# Patient Record
Sex: FEMALE | Race: BLACK OR AFRICAN AMERICAN | NOT HISPANIC OR LATINO | Employment: FULL TIME | URBAN - METROPOLITAN AREA
[De-identification: names, ages, dates, MRNs, and addresses within clinical notes are randomized per-mention and may not be internally consistent; named-entity substitution may affect disease eponyms.]

---

## 2017-01-24 ENCOUNTER — PROCEDURE VISIT (OUTPATIENT)
Dept: OBSTETRICS AND GYNECOLOGY | Age: 32
End: 2017-01-24

## 2017-01-24 VITALS
BODY MASS INDEX: 27.64 KG/M2 | DIASTOLIC BLOOD PRESSURE: 74 MMHG | HEIGHT: 63 IN | WEIGHT: 156 LBS | SYSTOLIC BLOOD PRESSURE: 100 MMHG

## 2017-01-24 DIAGNOSIS — N87.1 DYSPLASIA OF CERVIX, HIGH GRADE CIN 2: ICD-10-CM

## 2017-01-24 DIAGNOSIS — Z01.812 PRE-PROCEDURE LAB EXAM: Primary | ICD-10-CM

## 2017-01-24 LAB
B-HCG UR QL: NEGATIVE
INTERNAL NEGATIVE CONTROL: NEGATIVE
INTERNAL POSITIVE CONTROL: POSITIVE
Lab: NORMAL

## 2017-01-24 PROCEDURE — 57461 CONZ OF CERVIX W/SCOPE LEEP: CPT | Performed by: OBSTETRICS & GYNECOLOGY

## 2017-01-24 PROCEDURE — 81025 URINE PREGNANCY TEST: CPT | Performed by: OBSTETRICS & GYNECOLOGY

## 2017-01-24 NOTE — PROGRESS NOTES
Colposcopy Procedure Note with LEEP    Indications: Pap smear 6 months ago showed: low-grade squamous intraepithelial neoplasia (LGSIL - encompassing HPV,mild dysplasia,ENOCH I). Prior pap testing had been abnormal as well. Colposcopic biopsy at 6:00 showed HSIL, ENOCH 2 and LEEP recommended. Patient counseled as to risks of procedure to include bleeding, transfusion, infection, damage to cervix, stenosis or incompetent cervix with  labor or delivery, recurrent or persistent dysplasia, need for future treatment and follow-up and pt desires.    Procedure Details   The risks and benefits of the procedure and verbal and written informed consent obtained.    Speculum placed in vagina and excellent visualization of cervix achieved, cervix swabbed x 3 with acetic acid solution.    Findings:  Cervix: acetowhite lesion(s) noted at 6 o'clock; cervix swabbed with Lugol's solution and area of non-staining at 6:00 noted as well. Cervix injected with 2% lidocaine with 1/200 epi circumferentially using 2.5 cc resulting in appropriate blanching. LEEP loop 1.5 X 0.7 cm used to resect non-staining area and T zone without difficulty. ECC performed and base of LEEP cauterized with roller ball. Base hemostatic and  monsels applied.   .    Specimens: LEEP with stitch at 12:00 and  ECC    Complications: none.    Plan:  Return to discuss Pathology results in 2 weeks.  LEEP f/u instructions reviewed and patient understands to call with fever, pain or bleeding. Advised nothing per vagina for 6 weeks, no strenuous activity.    .Physical Exam   Genitourinary:   Genitourinary Comments: Cervix without gross lesions prior to colpo/LEEP, AWE and nonstaining region at 6:00 excised with LEEP loop, LEEP base hemostatic, roller ball used to cauterize base and Monsels applied to hemostatic base

## 2017-01-24 NOTE — MR AVS SNAPSHOT
Gustabo Gil   2017 9:00 AM   Procedure visit    Dept Phone:  [phone]   Encounter #:  [id number]    Provider:  Cassandra Fields MD   Department:  [facility]                Your Full Care Plan              Your Updated Medication List          This list is accurate as of: 17 10:46 AM.  Always use your most recent med list.                Biotin 10 MG capsule       fluconazole 150 MG tablet   Commonly known as:  DIFLUCAN   Take 1 tablet once, may repeat in 3 days if needed               We Performed the Following     POC Pregnancy, Urine     Reference Histopathology       You Were Diagnosed With        Codes Comments    Pre-procedure lab exam    -  Primary ICD-10-CM: Z01.812  ICD-9-CM: V72.63     Dysplasia of cervix, high grade ENOCH 2     ICD-10-CM: N87.1  ICD-9-CM: 622.12       Instructions     None    Patient Instructions History      Upcoming Appointments     Visit Type Date Time Department    IN OFFICE PROCEDURE 2017  9:00 AM [facility]      Love[employer] Signup     [facility] Love[employer] allows you to send messages to your doctor, view your test results, renew your prescriptions, schedule appointments, and more. To sign up, go to [employer] and click on the Sign Up Now link in the New User? box. Enter your Love[employer] Activation Code exactly as it appears below along with the last four digits of your Social Security Number and your Date of Birth () to complete the sign-up process. If you do not sign up before the expiration date, you must request a new code.    Love[employer] Activation Code: [email or web address]  Expires: 2017 10:46 AM    If you have questions, you can email [email or web address] or call [phone] to talk to our [employer] staff. Remember, Love[employer] is NOT to be used for urgent needs. For medical emergencies, dial 911.               Other Info from Your Visit           Allergies     No Known Allergies    "  Reason for Visit     Colposcopy leep      Vital Signs     Blood Pressure Height Weight Last Menstrual Period Body Mass Index Smoking Status    100/74 63\" (160 cm) 156 lb (70.8 kg) 01/09/2017 27.63 kg/m2 Never Smoker      Problems and Diagnoses Noted     Pre-operative laboratory examination    -  Primary    Dysplasia of cervix, high grade ENOCH 2          Results     POC Pregnancy, Urine      Component Value Standard Range & Units    HCG, Urine, QL Negative Negative    Lot Number [id number]     Internal Positive Control Positive     Internal Negative Control Negative                     "

## 2017-01-26 LAB
DX ICD CODE: NORMAL
DX ICD CODE: NORMAL
PATH REPORT.FINAL DX SPEC: NORMAL
PATH REPORT.GROSS SPEC: NORMAL
PATH REPORT.RELEVANT HX SPEC: NORMAL
PATH REPORT.SITE OF ORIGIN SPEC: NORMAL
PATHOLOGIST NAME: NORMAL
PAYMENT PROCEDURE: NORMAL

## 2017-01-27 ENCOUNTER — TELEPHONE (OUTPATIENT)
Dept: OBSTETRICS AND GYNECOLOGY | Age: 32
End: 2017-01-27

## 2017-01-27 NOTE — TELEPHONE ENCOUNTER
----- Message from Cassandra Fields MD sent at 1/26/2017  5:43 PM EST -----  Call pt, LEEP with HSIL as expected, all margins are negative ( good) so keep scheduled postop or call with issues

## 2017-01-30 ENCOUNTER — TELEPHONE (OUTPATIENT)
Dept: OBSTETRICS AND GYNECOLOGY | Age: 32
End: 2017-01-30

## 2017-01-30 DIAGNOSIS — N76.0 BV (BACTERIAL VAGINOSIS): Primary | ICD-10-CM

## 2017-01-30 DIAGNOSIS — B96.89 BV (BACTERIAL VAGINOSIS): Primary | ICD-10-CM

## 2017-01-30 RX ORDER — METRONIDAZOLE 500 MG/1
500 TABLET ORAL 2 TIMES DAILY
Qty: 14 TABLET | Refills: 0 | Status: SHIPPED | OUTPATIENT
Start: 2017-01-30 | End: 2017-02-06

## 2017-01-31 NOTE — TELEPHONE ENCOUNTER
Pt with BV symptoms after LEEP no active bleeding, fever or pelvic pain. She has used flagyl in past, sent in, pt aware to avoid alcohol while on treatment

## 2017-02-03 ENCOUNTER — TELEPHONE (OUTPATIENT)
Dept: OBSTETRICS AND GYNECOLOGY | Age: 32
End: 2017-02-03

## 2017-02-06 ENCOUNTER — OFFICE VISIT (OUTPATIENT)
Dept: OBSTETRICS AND GYNECOLOGY | Age: 32
End: 2017-02-06

## 2017-02-06 VITALS
SYSTOLIC BLOOD PRESSURE: 110 MMHG | DIASTOLIC BLOOD PRESSURE: 70 MMHG | HEIGHT: 63 IN | BODY MASS INDEX: 28.35 KG/M2 | WEIGHT: 160 LBS

## 2017-02-06 DIAGNOSIS — D06.9 SEVERE CERVICAL DYSPLASIA: Primary | ICD-10-CM

## 2017-02-06 PROCEDURE — 99024 POSTOP FOLLOW-UP VISIT: CPT | Performed by: OBSTETRICS & GYNECOLOGY

## 2017-02-06 NOTE — PROGRESS NOTES
"Chief complaint:postop     HPI  Gustabo Gil is a 32 y.o. female. Pt without significant issues. BV better after treatment. Still with light, blood-tinged discharge. No heavy bleeding or pelvic pain.        The following portions of the patient's history were reviewed and updated as appropriate: allergies, current medications, past family history, past medical history, past social history, past surgical history and problem list.    Review of Systems  Pertinent items are noted in HPI.    Visit Vitals   • /70   • Ht 63\" (160 cm)   • Wt 160 lb (72.6 kg)   • LMP 01/09/2017   • BMI 28.34 kg/m2       Objective   Physical Exam   Constitutional: She appears well-developed and well-nourished.   Pulmonary/Chest: Effort normal.   Abdominal: Soft. She exhibits no distension. There is no tenderness.   Genitourinary: Vagina normal and uterus normal.   Genitourinary Comments: LEEP base healing well, cervix and uterus are nontender       Assessment/Plan   Gustabo was seen today for follow-up.    Diagnoses and all orders for this visit:    Severe cervical dysplasia      Normal postop check, f/u 4 weeks then pap Q 6 months for 2 years         "

## 2017-03-06 ENCOUNTER — OFFICE VISIT (OUTPATIENT)
Dept: OBSTETRICS AND GYNECOLOGY | Age: 32
End: 2017-03-06

## 2017-03-06 VITALS
DIASTOLIC BLOOD PRESSURE: 72 MMHG | WEIGHT: 157 LBS | SYSTOLIC BLOOD PRESSURE: 100 MMHG | BODY MASS INDEX: 27.82 KG/M2 | HEIGHT: 63 IN

## 2017-03-06 DIAGNOSIS — Z80.41 FAMILY HISTORY OF OVARIAN CANCER: ICD-10-CM

## 2017-03-06 DIAGNOSIS — Z01.419 ENCOUNTER FOR GYNECOLOGICAL EXAMINATION: ICD-10-CM

## 2017-03-06 DIAGNOSIS — D06.9 SEVERE CERVICAL DYSPLASIA: Primary | ICD-10-CM

## 2017-03-06 DIAGNOSIS — Z09 POSTOP CHECK: ICD-10-CM

## 2017-03-06 PROCEDURE — 99395 PREV VISIT EST AGE 18-39: CPT | Performed by: OBSTETRICS & GYNECOLOGY

## 2017-03-06 NOTE — PROGRESS NOTES
"Subjective     Chief Complaint   Patient presents with   • Follow-up     f/u leep       History of Present Illness    Gustabo Gil is a 32 y.o.  who presents for annual exam and LEEP postop  Pt recently found out that pat aunt  of ovarian cancer, she does not know of any other cancers on that side and she is aware her father had 8 sisters    Obstetric History:  OB History      Para Term  AB TAB SAB Ectopic Multiple Living    1    1  1            Menstrual History:     Patient's last menstrual period was 2017.         Current contraception: abstinence  History of abnormal Pap smear: yes - recent LEEP with ENOCH 2  Received Gardasil immunization: no  Perform regular self breast exam: no  Family history of uterine or ovarian cancer: yes - pat aunt  Family History of colon cancer: no  Family history of breast cancer: no    Mammogram: not indicated.  Colonoscopy: not indicated.  DEXA: not indicated.    Exercise: moderately active      The following portions of the patient's history were reviewed and updated as appropriate: allergies, current medications, past family history, past medical history, past social history, past surgical history and problem list.    Review of Systems    A comprehensive review of systems was negative except for: Genitourinary: positive for pt had an episode of right pelvic pain, not present currently but with prior cycle     Objective   Physical Exam    Visit Vitals   • /72   • Ht 63\" (160 cm)   • Wt 157 lb (71.2 kg)   • LMP 2017   • BMI 27.81 kg/m2       General:   alert, appears stated age, cooperative and no distress   Neck: no asymmetry, masses, or scars and thyroid normal to palpation   Heart: regular rate and rhythm   Lungs: clear to auscultation bilaterally   Abdomen: soft, non-tender, without masses or organomegaly   Breast: inspection negative, no nipple discharge or bleeding, no masses or nodularity palpable   Vulva: normal, Bartholin's, " Urethra, [place]'s normal   Vagina: normal mucosa, normal discharge   Cervix: no lesions and LEEP base healed   Uterus: normal size, mobile, non-tender, normal shape and consistency   Adnexa: normal adnexa and no mass, fullness, tenderness   Rectal: not indicated     Assessment/Plan   Gustabo was seen today for follow-up.    Diagnoses and all orders for this visit:    Severe cervical dysplasia    Postop check    Encounter for gynecological examination    Family history of ovarian cancer        All questions answered.  Breast self exam technique reviewed and patient encouraged to perform self-exam monthly.  Discussed healthy lifestyle modifications.  Recommended 30 minutes of aerobic exercise five times per week.    Pt with recent news that paternal aunt had ovarian cancer, she denies any other known cancers on that side, reviewed option for genetic testing and gave handout, pt will consider; did note that her father had very large family and she does not know of any other cancers, she will attempt to get more information from that side    Pt did have right pelvic pain in last few months, will check u/s--offered u/s today, pt had to leave and scheduled in future    F/u pap in 5 months for post-LEEP surveillance, path with negative margins

## 2017-06-14 ENCOUNTER — TELEPHONE (OUTPATIENT)
Dept: OBSTETRICS AND GYNECOLOGY | Age: 32
End: 2017-06-14

## 2017-06-14 NOTE — TELEPHONE ENCOUNTER
Called pt back, she reports she has some pain on right side about a week ago and then also throughout abdomen, she denies fever, bleeding or N/V; she denies pregnancy and reports menses about q 35 days and there has been no change in menses. Pt had u/s scheduled for pelvic discomfort but no showed in March. Patient reports intermittent but worse last few days, recommend she go to ER if significant pain noted or with fever. If symptoms improve, recommend pt call in am and re-schedule visit and u/s since this was recommended previously

## 2017-06-15 ENCOUNTER — PROCEDURE VISIT (OUTPATIENT)
Dept: OBSTETRICS AND GYNECOLOGY | Age: 32
End: 2017-06-15

## 2017-06-15 DIAGNOSIS — R10.2 PELVIC PAIN IN FEMALE: Primary | ICD-10-CM

## 2017-06-15 PROCEDURE — 76830 TRANSVAGINAL US NON-OB: CPT | Performed by: OBSTETRICS & GYNECOLOGY

## 2017-06-16 ENCOUNTER — TELEPHONE (OUTPATIENT)
Dept: OBSTETRICS AND GYNECOLOGY | Age: 32
End: 2017-06-16

## 2017-06-16 NOTE — TELEPHONE ENCOUNTER
Pt came in to office yesterday at 8:00 am and was worked in for u/s by staff since she had no showed for prior. Patient was not seen by practitioner. Reviewed u/s and simple follicular cyst was noted in right ovary measuring 1.7 X 1.1 with small fluid in right adnexa. Normal uterus and left ovary noted. Pt called today for me to review u/s. Spoke with pt and advised pt of u/s findings but noted I had recommended during prior conversation that she call and schedule visit with MD or practitioner and U/S. Pt did not call prior to u/s yesterday to schedule visit but instead came to desk requesting u/s only. Advised pt that this is not adequate evaluation for worsening pain and that she could have ectopic pregnancy, PID or other non-gyn issues causing pain. Advised pt that source of pain could be life-threatening and recommended she go to ER or urgent care asap.

## 2017-07-11 ENCOUNTER — TELEPHONE (OUTPATIENT)
Dept: OBSTETRICS AND GYNECOLOGY | Age: 32
End: 2017-07-11

## 2017-07-11 DIAGNOSIS — B96.89 BV (BACTERIAL VAGINOSIS): Primary | ICD-10-CM

## 2017-07-11 DIAGNOSIS — N76.0 BV (BACTERIAL VAGINOSIS): Primary | ICD-10-CM

## 2017-07-11 RX ORDER — METRONIDAZOLE 500 MG/1
500 TABLET ORAL 2 TIMES DAILY
Qty: 14 TABLET | Refills: 0 | Status: SHIPPED | OUTPATIENT
Start: 2017-07-11 | End: 2017-07-18

## 2017-07-11 NOTE — TELEPHONE ENCOUNTER
Called pt back, she feels she has BV and requested prescription for flagyl, she tolerates this well and knows not to use with alcohol intake. Reviewed chart and advised pt she is due for a f/u pap from her LEEP. She reports she will call when she gets back in town in 2 weeks and schedule appt.

## 2017-08-22 ENCOUNTER — PROCEDURE VISIT (OUTPATIENT)
Dept: OBSTETRICS AND GYNECOLOGY | Age: 32
End: 2017-08-22

## 2017-08-22 VITALS
DIASTOLIC BLOOD PRESSURE: 56 MMHG | HEIGHT: 63 IN | SYSTOLIC BLOOD PRESSURE: 100 MMHG | WEIGHT: 162 LBS | BODY MASS INDEX: 28.7 KG/M2

## 2017-08-22 DIAGNOSIS — D06.9 SEVERE CERVICAL DYSPLASIA: Primary | ICD-10-CM

## 2017-08-22 PROCEDURE — 99212 OFFICE O/P EST SF 10 MIN: CPT | Performed by: OBSTETRICS & GYNECOLOGY

## 2017-08-22 NOTE — PROGRESS NOTES
"Chief complaint:hx HSIL     HPI  Gustabo Gil is a 32 y.o. female. Here for f/u pap after LEEP with HSIL with negative margins. Patient reports that her menses are regular. She was evaluated for some pelvic pain and reports it has resolved except for occasional discomfort around ovulation. Pt was treated for UTI during that episode and acute pain resolved.  Patient denies irregular bleeding. She recently took course of flagyl recently for BV, she still has some discharge but improved and odor resolved        The following portions of the patient's history were reviewed and updated as appropriate: allergies, current medications, past family history, past medical history, past social history, past surgical history and problem list.    Review of Systems  Pertinent items are noted in HPI.    /56  Ht 63\" (160 cm)  Wt 162 lb (73.5 kg)  LMP 07/26/2017  BMI 28.7 kg/m2    Objective   Physical Exam   Constitutional: She appears well-developed and well-nourished.   Abdominal: Soft. She exhibits no distension. There is no tenderness.   Genitourinary: Vagina normal.   Genitourinary Comments: No CMT, cervix normal and without lesions, uterus mobile and nontender, no adnexal masses or tenderness       Assessment/Plan   Gustabo was seen today for follow-up.    Diagnoses and all orders for this visit:    Severe cervical dysplasia  -     IGP, Apt HPV,rfx 16 / 18,45      If pap negative, plan f/u in 6 months, will also be due for annual         "

## 2017-08-28 LAB
CYTOLOGIST CVX/VAG CYTO: ABNORMAL
CYTOLOGY CVX/VAG DOC THIN PREP: ABNORMAL
DX ICD CODE: ABNORMAL
DX ICD CODE: ABNORMAL
HIV 1 & 2 AB SER-IMP: ABNORMAL
HPV I/H RISK 4 DNA CVX QL PROBE+SIG AMP: NEGATIVE
OTHER STN SPEC: ABNORMAL
PATH REPORT.FINAL DX SPEC: ABNORMAL
PATHOLOGIST CVX/VAG CYTO: ABNORMAL
STAT OF ADQ CVX/VAG CYTO-IMP: ABNORMAL

## 2017-09-19 ENCOUNTER — TELEPHONE (OUTPATIENT)
Dept: OBSTETRICS AND GYNECOLOGY | Age: 32
End: 2017-09-19

## 2017-09-19 NOTE — TELEPHONE ENCOUNTER
Pt called to sched, isn't sure what she needs. I didn't see any notes, saw pt's pap but did not discuss with her as I wasn't sure of you had.

## 2017-09-22 ENCOUNTER — TELEPHONE (OUTPATIENT)
Dept: OBSTETRICS AND GYNECOLOGY | Age: 32
End: 2017-09-22

## 2017-09-22 NOTE — TELEPHONE ENCOUNTER
Pt called and reviewed pap. Noted HPV negative. Patient still needs colpo since s/p LEEP recently. Patient understands and transferred to  to book

## 2017-10-31 ENCOUNTER — PROCEDURE VISIT (OUTPATIENT)
Dept: OBSTETRICS AND GYNECOLOGY | Age: 32
End: 2017-10-31

## 2017-10-31 VITALS
HEIGHT: 63 IN | BODY MASS INDEX: 28.7 KG/M2 | SYSTOLIC BLOOD PRESSURE: 112 MMHG | DIASTOLIC BLOOD PRESSURE: 60 MMHG | WEIGHT: 162 LBS

## 2017-10-31 DIAGNOSIS — Z01.812 PRE-PROCEDURE LAB EXAM: Primary | ICD-10-CM

## 2017-10-31 DIAGNOSIS — Z87.410 HISTORY OF CERVICAL DYSPLASIA: ICD-10-CM

## 2017-10-31 DIAGNOSIS — R87.610 ASCUS OF CERVIX WITH NEGATIVE HIGH RISK HPV: ICD-10-CM

## 2017-10-31 PROBLEM — D06.9 SEVERE CERVICAL DYSPLASIA: Status: RESOLVED | Noted: 2017-02-06 | Resolved: 2017-10-31

## 2017-10-31 PROCEDURE — 57454 BX/CURETT OF CERVIX W/SCOPE: CPT | Performed by: OBSTETRICS & GYNECOLOGY

## 2017-10-31 PROCEDURE — 81025 URINE PREGNANCY TEST: CPT | Performed by: OBSTETRICS & GYNECOLOGY

## 2017-10-31 NOTE — PROGRESS NOTES
Procedure   Procedures       Physical Exam   Genitourinary:       Genitourinary Comments: Faint AWE and nonstaining with lugols at sites in red, biopsies obtained       Colposcopy Procedure Note    Indications: Pap smear 2 months ago showed: ASCUS with NEGATIVE high risk HPV. The prior pap showed low-grade squamous intraepithelial neoplasia (LGSIL - encompassing HPV,mild dysplasia,ENOCH I).  Prior cervical/vaginal disease: hsil. Prior cervical treatment:  .    Procedure Details   The risks and benefits of the procedure and LEEP informed consent obtained.    Speculum placed in vagina and excellent visualization of cervix achieved, cervix swabbed x 3 with acetic acid solution.    Findings:  Cervix: acetowhite lesion(s) noted at 11,3 o'clock; cervix swabbed with Lugol's solution, endocervical curettage performed, cervical biopsies taken at 11, 3 o'clock, specimen labelled and sent to pathology and hemostasis achieved with Monsel's solution.      Specimens: ECC and cervical biopsies at 11,3:00    Complications: none.    Plan:  Specimens labelled and sent to Pathology.  Will base further treatment on Pathology findings.  Post biopsy instructions given to patient.    If negative biopsies, plan pap in 6 months, if LSIL, plan colpo at f/u ( 6 month f/u colpo planned )    10/31/2017  Cassandra Fields MD

## 2017-11-06 ENCOUNTER — TELEPHONE (OUTPATIENT)
Dept: OBSTETRICS AND GYNECOLOGY | Age: 32
End: 2017-11-06

## 2017-11-06 NOTE — TELEPHONE ENCOUNTER
----- Message from Cassandra Fields MD sent at 11/3/2017 11:55 PM EDT -----  Call pt, biopsies with squamous atypia, pathologist feels could be reactive, no definite dysplasia so would observe with pap in 6 months

## 2018-02-26 ENCOUNTER — OFFICE VISIT (OUTPATIENT)
Dept: OBSTETRICS AND GYNECOLOGY | Age: 33
End: 2018-02-26

## 2018-02-26 VITALS
SYSTOLIC BLOOD PRESSURE: 130 MMHG | BODY MASS INDEX: 29.06 KG/M2 | DIASTOLIC BLOOD PRESSURE: 74 MMHG | HEIGHT: 63 IN | WEIGHT: 164 LBS

## 2018-02-26 DIAGNOSIS — Z87.410 HISTORY OF CERVICAL DYSPLASIA: Primary | ICD-10-CM

## 2018-02-26 DIAGNOSIS — Z01.419 VISIT FOR GYNECOLOGIC EXAMINATION: ICD-10-CM

## 2018-02-26 DIAGNOSIS — Z11.51 SCREENING FOR HPV (HUMAN PAPILLOMAVIRUS): ICD-10-CM

## 2018-02-26 PROCEDURE — 99395 PREV VISIT EST AGE 18-39: CPT | Performed by: OBSTETRICS & GYNECOLOGY

## 2018-02-26 NOTE — PROGRESS NOTES
"Subjective     Chief Complaint   Patient presents with   • Annual Exam     no problems       History of Present Illness    Gustabo iGl is a 33 y.o.  who presents for annual exam.  Pt reports menses are about every 35 days, flow is normal, she denies any gyn issues  Obstetric History:  OB History      Para Term  AB Living    1    1     SAB TAB Ectopic Multiple Live Births    1             Menstrual History:     Patient's last menstrual period was 2018.         Current contraception: condoms  History of abnormal Pap smear: yes - hx LEEP last year  Received Gardasil immunization: no  Perform regular self breast exam: no  Family history of uterine or ovarian cancer: yes - Pat aunt  Family History of colon cancer: no  Family history of breast cancer: no    Mammogram: not indicated.  Colonoscopy: not indicated.  DEXA: not indicated.    Exercise: moderately active  Calcium/Vitamin D: adequate intake    The following portions of the patient's history were reviewed and updated as appropriate: allergies, current medications, past family history, past medical history, past social history, past surgical history and problem list.    Review of Systems    Review of Systems   Constitutional: Negative for fatigue.   Respiratory: Negative for shortness of breath.    Gastrointestinal: Negative for abdominal pain.   Genitourinary: Negative for dysuria. negative for abnormal bleeding  Neurological: Negative for headaches.   Psychiatric/Behavioral: Negative for dysphoric mood.         Objective   Physical Exam    /74  Ht 160 cm (63\")  Wt 74.4 kg (164 lb)  LMP 2018  BMI 29.05 kg/m2    General:   alert, appears stated age, cooperative and no distress   Neck: no asymmetry, masses, or scars and thyroid normal to palpation   Heart: regular rate and rhythm, S1, S2 normal, no murmur, click, rub or gallop   Lungs: clear to auscultation bilaterally   Abdomen: soft, non-tender, without masses or " organomegaly   Breast: inspection negative, no nipple discharge or bleeding, no masses or nodularity palpable and no axillary adenopathy   Vulva: normal, Bartholin's, Urethra, [place]'s normal   Vagina: normal mucosa, normal discharge   Cervix: no lesions and Pap done   Uterus: normal size, mobile, non-tender, normal shape and consistency   Adnexa: normal adnexa and no mass, fullness, tenderness   Rectal: not indicated     Assessment/Plan   Gustabo was seen today for annual exam.    Diagnoses and all orders for this visit:    History of cervical dysplasia  -     IGP, Apt HPV,rfx 16 / 18,45 - ThinPrep Vial, Cervix    Visit for gynecologic examination  -     IGP, Apt HPV,rfx 16 / 18,45 - ThinPrep Vial, Cervix    Screening for HPV (human papillomavirus)  -     IGP, Apt HPV,rfx 16 / 18,45 - ThinPrep Vial, Cervix        All questions answered.  Breast self exam technique reviewed and patient encouraged to perform self-exam monthly.  Discussed healthy lifestyle modifications.  Recommended 30 minutes of aerobic exercise five times per week.    Pt lists family hx of ovarian cancer. Offered My Risk testing. Advised recommended due to hx. Pt notes she listed this but is not sure if it was ovarian cancer. Pt declines testing today but will consider, information given    Pap and hpv done due to hx HSIL, f/u pap was ascus hpv negative, colpo was negative for HSIL; reviewed with pt that we will call with pap, if negative, plan f/u 6 months due to hx. Pt advised to call if she does not receive call within 2 weeks with results

## 2018-03-01 LAB
CYTOLOGIST CVX/VAG CYTO: NORMAL
CYTOLOGY CVX/VAG DOC THIN PREP: NORMAL
DX ICD CODE: NORMAL
HIV 1 & 2 AB SER-IMP: NORMAL
HPV I/H RISK 4 DNA CVX QL PROBE+SIG AMP: NEGATIVE
OTHER STN SPEC: NORMAL
PATH REPORT.FINAL DX SPEC: NORMAL
STAT OF ADQ CVX/VAG CYTO-IMP: NORMAL

## 2018-03-05 ENCOUNTER — TELEPHONE (OUTPATIENT)
Dept: OBSTETRICS AND GYNECOLOGY | Age: 33
End: 2018-03-05

## 2018-03-16 ENCOUNTER — TELEPHONE (OUTPATIENT)
Dept: OBSTETRICS AND GYNECOLOGY | Age: 33
End: 2018-03-16

## 2018-03-16 RX ORDER — METRONIDAZOLE 500 MG/1
500 TABLET ORAL 2 TIMES DAILY
Qty: 14 TABLET | Refills: 0 | Status: SHIPPED | OUTPATIENT
Start: 2018-03-16 | End: 2018-03-23

## 2018-03-16 NOTE — TELEPHONE ENCOUNTER
Pt called and requested another course of flagyl for BV. She has taken in past and has been counseled previously regarding use of medication and side effects.

## 2018-03-16 NOTE — TELEPHONE ENCOUNTER
PT WANTING FLAGYL CALLED IN DUE TO BV SYMPTOMS, +ODOR AND D/C. STATES DR CRABTREE JUST CALLS HER SOMETHING IN.

## 2018-08-24 ENCOUNTER — OFFICE VISIT (OUTPATIENT)
Dept: OBSTETRICS AND GYNECOLOGY | Age: 33
End: 2018-08-24

## 2018-08-24 VITALS
SYSTOLIC BLOOD PRESSURE: 110 MMHG | HEIGHT: 63 IN | BODY MASS INDEX: 28.88 KG/M2 | DIASTOLIC BLOOD PRESSURE: 70 MMHG | WEIGHT: 163 LBS

## 2018-08-24 DIAGNOSIS — Z87.410 HISTORY OF CERVICAL DYSPLASIA: Primary | ICD-10-CM

## 2018-08-24 DIAGNOSIS — Z11.3 SCREEN FOR STD (SEXUALLY TRANSMITTED DISEASE): ICD-10-CM

## 2018-08-24 PROCEDURE — 99213 OFFICE O/P EST LOW 20 MIN: CPT | Performed by: OBSTETRICS & GYNECOLOGY

## 2018-08-24 NOTE — PROGRESS NOTES
"Chief complaint:hx HSIL    HPI  Gustabo Gil is a 33 y.o. female presents for f/u pap smear due to hx HSIL. Pt denies abnormal bleeding, pelvic pain or abnormal discharge. She would like a screen for STD's        The following portions of the patient's history were reviewed and updated as appropriate: allergies, current medications, past family history, past medical history, past social history, past surgical history and problem list.    Review of Systems  Constitutional: negative  Genitourinary:neg for abnormal discharge, irregular bleeding or pelvic pain  Integument/breast: negative    /70   Ht 160 cm (63\")   Wt 73.9 kg (163 lb)   LMP 07/23/2018   BMI 28.87 kg/m²         Physical Exam   Constitutional: She appears well-developed and well-nourished.   Pulmonary/Chest: Effort normal.   Genitourinary:   Genitourinary Comments: Normal external female genitalia, vagina without lesions, cervix without lesions; no CMT, uterine or adnexal tenderness   Psychiatric: She has a normal mood and affect. Her behavior is normal.           Gustabo was seen today for follow-up.    Diagnoses and all orders for this visit:    History of cervical dysplasia  -     IGP, Apt HPV,rfx 16 / 18,45    Screen for STD (sexually transmitted disease)  -     Chlamydia trachomatis, Neisseria gonorrhoeae, Trichomonas vaginalis, PCR - Swab, Vagina  -     Hepatitis B Surface Antigen  -     HIV-1 / O / 2 Ag / Antibody 4th Generation  -     RPR      Repeat pap done, if normal, pt will return in 6 months for annual  STD screen requested today, pt denies known exposure or need for contraception          "

## 2018-08-25 LAB
HBV SURFACE AG SERPL QL IA: NEGATIVE
HIV 1+2 AB+HIV1 P24 AG SERPL QL IA: NON REACTIVE
RPR SER QL: NORMAL

## 2018-08-27 ENCOUNTER — TELEPHONE (OUTPATIENT)
Dept: OBSTETRICS AND GYNECOLOGY | Age: 33
End: 2018-08-27

## 2018-08-27 NOTE — TELEPHONE ENCOUNTER
----- Message from Cassandra Fields MD sent at 8/27/2018  7:53 AM EDT -----  Please call MsBrian Jeffery, her bloodwork is negative for HIV, RPR and Hep B

## 2018-08-28 ENCOUNTER — TELEPHONE (OUTPATIENT)
Dept: OBSTETRICS AND GYNECOLOGY | Age: 33
End: 2018-08-28

## 2018-08-28 LAB
C TRACH RRNA SPEC QL NAA+PROBE: NEGATIVE
CYTOLOGIST CVX/VAG CYTO: NORMAL
CYTOLOGY CVX/VAG DOC THIN PREP: NORMAL
DX ICD CODE: NORMAL
HIV 1 & 2 AB SER-IMP: NORMAL
HPV I/H RISK 4 DNA CVX QL PROBE+SIG AMP: NEGATIVE
N GONORRHOEA RRNA SPEC QL NAA+PROBE: NEGATIVE
OTHER STN SPEC: NORMAL
PATH REPORT.FINAL DX SPEC: NORMAL
STAT OF ADQ CVX/VAG CYTO-IMP: NORMAL
T VAGINALIS RRNA SPEC QL NAA+PROBE: NEGATIVE

## 2018-08-28 NOTE — TELEPHONE ENCOUNTER
----- Message from Cassandra Fields MD sent at 8/28/2018  8:02 AM EDT -----  Please call Gustabo, her vaginal STD check is negative/normal, her pap is pending

## 2019-11-06 ENCOUNTER — OFFICE VISIT (OUTPATIENT)
Dept: OBSTETRICS AND GYNECOLOGY | Age: 34
End: 2019-11-06

## 2019-11-06 ENCOUNTER — TELEPHONE (OUTPATIENT)
Dept: OBSTETRICS AND GYNECOLOGY | Age: 34
End: 2019-11-06

## 2019-11-06 VITALS
SYSTOLIC BLOOD PRESSURE: 110 MMHG | HEIGHT: 63 IN | BODY MASS INDEX: 28.77 KG/M2 | DIASTOLIC BLOOD PRESSURE: 70 MMHG | WEIGHT: 162.4 LBS

## 2019-11-06 DIAGNOSIS — Z87.410 HISTORY OF CERVICAL DYSPLASIA: Primary | ICD-10-CM

## 2019-11-06 DIAGNOSIS — Z01.419 ENCOUNTER FOR GYNECOLOGICAL EXAMINATION: ICD-10-CM

## 2019-11-06 DIAGNOSIS — N64.9 NIPPLE LESION: ICD-10-CM

## 2019-11-06 DIAGNOSIS — Z11.51 ENCOUNTER FOR SCREENING FOR HUMAN PAPILLOMAVIRUS (HPV): ICD-10-CM

## 2019-11-06 DIAGNOSIS — N63.0 BREAST MASS: ICD-10-CM

## 2019-11-06 DIAGNOSIS — Z01.84 IMMUNITY TO RUBELLA DETERMINED BY SEROLOGIC TEST: ICD-10-CM

## 2019-11-06 PROCEDURE — 81025 URINE PREGNANCY TEST: CPT | Performed by: OBSTETRICS & GYNECOLOGY

## 2019-11-06 PROCEDURE — 99395 PREV VISIT EST AGE 18-39: CPT | Performed by: OBSTETRICS & GYNECOLOGY

## 2019-11-06 RX ORDER — ASCORBIC ACID, CALCIUM CITRATE, IRON, VITAMIN D, DL- ALPHA- TOCOPHEROL ACETATE, THIAMINE, RIBOFLAVIN, NIACINAMIDE, PYRIDOXINE HYDROCHLORIDE, FOLIC ACID, IODINE, ZINC, COPPER, DOCUSATE SODIUM, DOCONEXENT AND ICOSAPENT
1 KIT DAILY
Qty: 30 TABLET | Refills: 12 | Status: SHIPPED | OUTPATIENT
Start: 2019-11-06 | End: 2020-09-01

## 2019-11-07 LAB — RUBV IGG SERPL IA-ACNC: 8.51 INDEX

## 2019-11-08 ENCOUNTER — TELEPHONE (OUTPATIENT)
Dept: OBSTETRICS AND GYNECOLOGY | Age: 34
End: 2019-11-08

## 2019-11-08 NOTE — TELEPHONE ENCOUNTER
----- Message from Cassandra Fields MD sent at 11/7/2019  8:00 PM EST -----  Please call Gustabo, her rubella IgG is immune. She does not need a vaccine

## 2019-11-10 LAB
CYTOLOGIST CVX/VAG CYTO: NORMAL
CYTOLOGY CVX/VAG DOC CYTO: NORMAL
CYTOLOGY CVX/VAG DOC THIN PREP: NORMAL
DX ICD CODE: NORMAL
HIV 1 & 2 AB SER-IMP: NORMAL
HPV I/H RISK 4 DNA CVX QL PROBE+SIG AMP: NEGATIVE
OTHER STN SPEC: NORMAL
STAT OF ADQ CVX/VAG CYTO-IMP: NORMAL

## 2019-11-12 ENCOUNTER — TELEPHONE (OUTPATIENT)
Dept: OBSTETRICS AND GYNECOLOGY | Age: 34
End: 2019-11-12

## 2019-11-12 NOTE — TELEPHONE ENCOUNTER
----- Message from Cassandra Fields MD sent at 11/11/2019  6:06 PM EST -----  Call Gustabo, her pap and hpv are negative/normal

## 2019-11-26 ENCOUNTER — TELEPHONE (OUTPATIENT)
Dept: OBSTETRICS AND GYNECOLOGY | Age: 34
End: 2019-11-26

## 2019-11-26 NOTE — TELEPHONE ENCOUNTER
FYI patient cancelled her diagnostic breast imaging 11/21 [facility] and did not reschedule.  I called pt, left msg asking her to call me to reschedule.  Pt has f/u gyn appt 12/3 w/ Dr. Fields.    Appt w/ Dr. Haines was not yet scheduled, they were waiting for imaging results first.

## 2019-12-20 ENCOUNTER — TELEPHONE (OUTPATIENT)
Dept: OBSTETRICS AND GYNECOLOGY | Age: 34
End: 2019-12-20

## 2019-12-31 ENCOUNTER — DOCUMENTATION (OUTPATIENT)
Dept: OBSTETRICS AND GYNECOLOGY | Age: 34
End: 2019-12-31

## 2019-12-31 NOTE — PROGRESS NOTES
Dr Fields received a missed appt letter from [facility].  Patient missed appt for 12/18/19.  Our office has made multiple attempts to reach patient by phone, no response @ this time.  Letter sent today.

## 2020-01-02 ENCOUNTER — TELEPHONE (OUTPATIENT)
Dept: OBSTETRICS AND GYNECOLOGY | Age: 35
End: 2020-01-02

## 2020-01-02 NOTE — TELEPHONE ENCOUNTER
I called patient.  He says that she bettye not get the U/s of her left breast, because it is too expensive.  Wants you to call her to further discuss.

## 2020-01-02 NOTE — TELEPHONE ENCOUNTER
Patient requesting a call back from Tiffanie in regards to r/s her appt.Was told to ask for Tiffanie.

## 2020-01-02 NOTE — TELEPHONE ENCOUNTER
Called pt back and discussed recommendations. She had a breast complaint at her last visit. She is declining the breast imaging at this time. She also declines visit with breast surgeon. She will consider further but declines at this time. Patient understands that imaging was recommended to assess further and than her symptoms could represent abnormal breast pathology. She understands this but will not proceed with imaging or appt with surgeon.    Also reviewed possible family history of ovarian cancer and recommended pt have genetic testing. She notes she is not sure if aunt did have ovarian cancer but will check with family and consider genetic testing if it was ovarian cancer.

## 2020-02-13 ENCOUNTER — TELEPHONE (OUTPATIENT)
Dept: OBSTETRICS AND GYNECOLOGY | Age: 35
End: 2020-02-13

## 2020-02-25 ENCOUNTER — TELEPHONE (OUTPATIENT)
Dept: OBSTETRICS AND GYNECOLOGY | Age: 35
End: 2020-02-25

## 2020-02-25 DIAGNOSIS — N64.9 BREAST LESION: Primary | ICD-10-CM

## 2020-02-25 NOTE — TELEPHONE ENCOUNTER
It has been too long since the last referral was sent per pt.  They are requesting a new referral to be sent over

## 2020-02-25 NOTE — TELEPHONE ENCOUNTER
Pt would like a referral to dr ram office to get the tags removed from her nipples. Do you need results from [facility] first? She has an appt on 2/27 at [facility]. Please advise

## 2020-02-27 ENCOUNTER — TELEPHONE (OUTPATIENT)
Dept: MAMMOGRAPHY | Facility: CLINIC | Age: 35
End: 2020-02-27

## 2020-02-27 NOTE — TELEPHONE ENCOUNTER
Called to schedule consult for skin tags on breast. No answer and not able to leave a voicemail.    Please make patient aware she will be seen for skin tag removal however he cannot schedule to remove tags at time of consult, per Dr. Haines

## 2020-03-04 ENCOUNTER — APPOINTMENT (OUTPATIENT)
Dept: WOMENS IMAGING | Facility: HOSPITAL | Age: 35
End: 2020-03-04

## 2020-03-04 PROCEDURE — 76641 ULTRASOUND BREAST COMPLETE: CPT | Performed by: RADIOLOGY

## 2020-03-04 PROCEDURE — 77066 DX MAMMO INCL CAD BI: CPT | Performed by: RADIOLOGY

## 2020-03-04 PROCEDURE — 77062 BREAST TOMOSYNTHESIS BI: CPT | Performed by: RADIOLOGY

## 2020-03-04 PROCEDURE — G0279 TOMOSYNTHESIS, MAMMO: HCPCS | Performed by: RADIOLOGY

## 2020-03-10 ENCOUNTER — TELEPHONE (OUTPATIENT)
Dept: OBSTETRICS AND GYNECOLOGY | Age: 35
End: 2020-03-10

## 2020-03-10 NOTE — TELEPHONE ENCOUNTER
----- Message from Cassandra Fields MD sent at 3/9/2020  7:51 AM EDT -----  Called pt and left message. Please inform pt that her imaging was benign. Radiologist recommended surgical consultation for further evaluation due to her symptoms

## 2020-03-10 NOTE — TELEPHONE ENCOUNTER
Pt called stating that you called and left a VM yesterday but I don't see a phone call to tell her anything. Please advise

## 2020-06-08 ENCOUNTER — TELEPHONE (OUTPATIENT)
Dept: OBSTETRICS AND GYNECOLOGY | Age: 35
End: 2020-06-08

## 2020-06-08 NOTE — TELEPHONE ENCOUNTER
Her blood type is A negative per records in epic. Advise her to take pregnancy test due to irregular bleeding and call back asap if positive. OK to schedule problem visit with next opening for further evaluation.

## 2020-06-08 NOTE — TELEPHONE ENCOUNTER
Patient called stated that she has irregular periods, and currently she's been bleeding after intercourse. she denies abdominal pain and any other symptoms. Patient has concerns and would like to be seen for a problem visit.     (patient would also like to know what her blood type is) please advise

## 2020-06-09 ENCOUNTER — OFFICE VISIT (OUTPATIENT)
Dept: OBSTETRICS AND GYNECOLOGY | Age: 35
End: 2020-06-09

## 2020-06-09 VITALS — SYSTOLIC BLOOD PRESSURE: 102 MMHG | DIASTOLIC BLOOD PRESSURE: 64 MMHG | BODY MASS INDEX: 30.47 KG/M2 | WEIGHT: 172 LBS

## 2020-06-09 DIAGNOSIS — N92.6 ABNORMAL MENSES: Primary | ICD-10-CM

## 2020-06-09 DIAGNOSIS — Z13.29 SCREENING FOR THYROID DISORDER: ICD-10-CM

## 2020-06-09 DIAGNOSIS — N93.0 POSTCOITAL BLEEDING: ICD-10-CM

## 2020-06-09 PROCEDURE — 81025 URINE PREGNANCY TEST: CPT | Performed by: OBSTETRICS & GYNECOLOGY

## 2020-06-09 PROCEDURE — 99213 OFFICE O/P EST LOW 20 MIN: CPT | Performed by: OBSTETRICS & GYNECOLOGY

## 2020-06-09 NOTE — PROGRESS NOTES
Chief complaint:spotting after intercourse    HPI  Gustabo Gil is a 35 y.o. female presents for evaluation due to vaginal bleeding she has noted occasionally after intercourse. The bleeding does not occur after every episode but about a few times per month. She reports her cycle length has been about 37 days the last 2 months. Her cycle length was about 30 days the prior several months. She is not having bleeding between cycles except after intercourse. She denies abnormal discharge or pelvic pain.         The following portions of the patient's history were reviewed and updated as appropriate: allergies, current medications, past family history, past medical history, past social history, past surgical history and problem list.    Review of Systems  Constitutional: negative for chills and fevers  Respiratory: negative for cough and dyspnea on exertion  Cardiovascular: negative for chest pain  Gastrointestinal: negative for abdominal pain, diarrhea, nausea and vomiting  Genitourinary:positive for bleeding after intercourse and recently longer cycle length  Hematologic/lymphatic: negative for easy bruising and excessive bleeding    /64   Wt 78 kg (172 lb)   LMP 05/24/2020 (Exact Date)   Breastfeeding No   BMI 30.47 kg/m²         Physical Exam   Constitutional: She is oriented to person, place, and time. She appears well-developed and well-nourished.   Neck: Neck supple.   Pulmonary/Chest: Effort normal.   Abdominal: Soft. She exhibits no distension. There is no tenderness. There is no guarding.   Genitourinary:   Genitourinary Comments: Normal external female genitalia. Vagina without lesions. Cervix without lesions or abnormal discharge. Cervix is not friable with exam. No CMT or uterine tenderness noted. No adnexal masses or tenderness.   Neurological: She is alert and oriented to person, place, and time.   Skin: Skin is warm and dry.   Psychiatric: She has a normal mood and affect. Her behavior is  normal.           Gustabo was seen today for follow-up.    Diagnoses and all orders for this visit:    Abnormal menses  -     POC Pregnancy, Urine  -     Ambulatory Referral to Infertility    Screening for thyroid disorder  -     TSH  -     T4, Free    Postcoital bleeding  -     NuSwab VG+ - Swab, Vagina      Unclear etiology of intermittent post-coital bleeding. Pt does have a history of cervical dysplasia but pap within the year was normal with negative HPV. No cervical lesions are noted. Recommend nuswab and thyroid testing. Pt will consider if she wants to proceed with thyroid test. Discussed reasoning. Pt and spouse desire pregnancy. He has children from prior relationship. Recommend RE consult as pt is AMA and she agrees. Recommend u/s appt at f/u visit for further evaluation for source of bleeding.

## 2020-06-10 ENCOUNTER — TELEPHONE (OUTPATIENT)
Dept: OBSTETRICS AND GYNECOLOGY | Age: 35
End: 2020-06-10

## 2020-06-10 LAB
T4 FREE SERPL-MCNC: 1.21 NG/DL (ref 0.93–1.7)
TSH SERPL DL<=0.005 MIU/L-ACNC: 1.15 UIU/ML (ref 0.27–4.2)

## 2020-06-10 NOTE — TELEPHONE ENCOUNTER
----- Message from Cassandra Fields MD sent at 6/10/2020  9:02 AM EDT -----  Please call Gustabo, her thyroid labs are normal

## 2020-06-15 LAB
A VAGINAE DNA VAG QL NAA+PROBE: NORMAL SCORE
BVAB2 DNA VAG QL NAA+PROBE: NORMAL SCORE
C ALBICANS DNA VAG QL NAA+PROBE: NEGATIVE
C GLABRATA DNA VAG QL NAA+PROBE: NEGATIVE
C TRACH DNA VAG QL NAA+PROBE: NEGATIVE
MEGA1 DNA VAG QL NAA+PROBE: NORMAL SCORE
N GONORRHOEA DNA VAG QL NAA+PROBE: NEGATIVE
T VAGINALIS DNA VAG QL NAA+PROBE: NEGATIVE

## 2020-06-16 ENCOUNTER — OFFICE VISIT (OUTPATIENT)
Dept: OBSTETRICS AND GYNECOLOGY | Age: 35
End: 2020-06-16

## 2020-06-16 ENCOUNTER — PROCEDURE VISIT (OUTPATIENT)
Dept: OBSTETRICS AND GYNECOLOGY | Age: 35
End: 2020-06-16

## 2020-06-16 VITALS
BODY MASS INDEX: 30.44 KG/M2 | HEIGHT: 63 IN | SYSTOLIC BLOOD PRESSURE: 100 MMHG | DIASTOLIC BLOOD PRESSURE: 60 MMHG | WEIGHT: 171.8 LBS

## 2020-06-16 DIAGNOSIS — N84.0 ENDOMETRIAL POLYP: ICD-10-CM

## 2020-06-16 DIAGNOSIS — N93.0 POSTCOITAL BLEEDING: Primary | ICD-10-CM

## 2020-06-16 DIAGNOSIS — N92.6 ABNORMAL MENSES: Primary | ICD-10-CM

## 2020-06-16 DIAGNOSIS — N93.0 POSTCOITAL BLEEDING: ICD-10-CM

## 2020-06-16 PROBLEM — N83.201 CYST OF RIGHT OVARY: Status: ACTIVE | Noted: 2020-06-16

## 2020-06-16 PROCEDURE — 76830 TRANSVAGINAL US NON-OB: CPT | Performed by: OBSTETRICS & GYNECOLOGY

## 2020-06-16 PROCEDURE — 99213 OFFICE O/P EST LOW 20 MIN: CPT | Performed by: OBSTETRICS & GYNECOLOGY

## 2020-06-16 NOTE — PROGRESS NOTES
"Chief complaint:postcoital bleeding    HPI  Gustabo Gil is a 35 y.o. female presents for u/s evaluation due to postcoital bleeding. She had pelvic exam last visit and cervix appeared normal and without lesions. She notes she did not have bleeding following intercourse recently.        The following portions of the patient's history were reviewed and updated as appropriate: allergies, current medications, past family history, past medical history, past social history, past surgical history and problem list.    Review of Systems  Constitutional: negative  Respiratory: negative  Genitourinary:positive for intermittent post-coital bleeding    /60   Ht 160 cm (63\")   Wt 77.9 kg (171 lb 12.8 oz)   LMP 05/24/2020 (Exact Date)   Breastfeeding No   BMI 30.43 kg/m²         Physical Exam   Constitutional: She is oriented to person, place, and time. She appears well-developed and well-nourished.   HENT:   Head: Normocephalic and atraumatic.   Pulmonary/Chest: Effort normal.   Genitourinary:   Genitourinary Comments: No significant tenderness with tv u/s imaging of uterus/adnexa   Neurological: She is alert and oriented to person, place, and time.   Psychiatric: She has a normal mood and affect. Her behavior is normal.     tv u/s: Uterus imaged and endometrium with focal area suggestive of polyp measuring 2.1 X 1.7 cm. Left ovary contains multiple small follicles, right ovary contains complex cyst measuring 3.0 X 2.6 cm with faint homogenous echogenicity suggestive of endometrioma vs hemorrhagic cyst      Gustabo was seen today for follow-up.    Diagnoses and all orders for this visit:    Postcoital bleeding      U/s imaging is suggestive of endometrial polyp. Pt is symptomatic so recommend evaluation with hysteroscopy, D&C. Complex cyst noted in right ovary that could represent hemorrhagic corpus luteum vs endometrioma. Patient counseled. Advised that surgical evaluation is indicated for endometrial lesion. " Discussed that follow-up imaging of ovary is needed as well. Patient was planning on proceeding with consult with reproductive endocrinologist. Given findings, discussed option of surgical evaluation with ARMANI RUVALCABA to allow for any further procedures related to desired pregnancy be done at same time. Patient agrees with this plan. She will call ARMANI RUVALCABA today and will forward results of imaging/labs.

## 2020-07-31 ENCOUNTER — TELEPHONE (OUTPATIENT)
Dept: OBSTETRICS AND GYNECOLOGY | Age: 35
End: 2020-07-31

## 2020-07-31 NOTE — TELEPHONE ENCOUNTER
Called pt and reviewed her recent evaluations. She reports the lesions on her nipple were excised and benign. She has a f/u u/s with ARMANI RUVALCABA Monday for pre-surgery planning. She will keep me posted regarding ongoing care/plans.

## 2020-07-31 NOTE — TELEPHONE ENCOUNTER
(Donnie simpson) Pt wanted to update you because you advised her to get some moles removed and she has. Pt is also going to meet with an endocrinologist to have a saline U/S done and she has questions about the procedure that will be done after. Pt is requesting a call from you whenever you have the chance, she has a meeting from 10:00-11:00 but she is free any other time.     [phone]

## 2020-08-27 ENCOUNTER — PREP FOR SURGERY (OUTPATIENT)
Dept: OTHER | Facility: HOSPITAL | Age: 35
End: 2020-08-27

## 2020-08-27 DIAGNOSIS — N84.0 ENDOMETRIAL POLYP: Primary | ICD-10-CM

## 2020-08-27 RX ORDER — CEFAZOLIN SODIUM 2 G/100ML
2 INJECTION, SOLUTION INTRAVENOUS ONCE
Status: CANCELLED | OUTPATIENT
Start: 2020-09-03

## 2020-09-01 ENCOUNTER — APPOINTMENT (OUTPATIENT)
Dept: PREADMISSION TESTING | Facility: HOSPITAL | Age: 35
End: 2020-09-01

## 2020-09-01 VITALS
WEIGHT: 166 LBS | SYSTOLIC BLOOD PRESSURE: 123 MMHG | DIASTOLIC BLOOD PRESSURE: 82 MMHG | RESPIRATION RATE: 16 BRPM | BODY MASS INDEX: 29.41 KG/M2 | HEIGHT: 63 IN | HEART RATE: 56 BPM | OXYGEN SATURATION: 100 % | TEMPERATURE: 98 F

## 2020-09-01 LAB
BASOPHILS # BLD AUTO: 0.06 10*3/MM3 (ref 0–0.2)
BASOPHILS NFR BLD AUTO: 1.1 % (ref 0–1.5)
DEPRECATED RDW RBC AUTO: 42.7 FL (ref 37–54)
EOSINOPHIL # BLD AUTO: 0.08 10*3/MM3 (ref 0–0.4)
EOSINOPHIL NFR BLD AUTO: 1.5 % (ref 0.3–6.2)
ERYTHROCYTE [DISTWIDTH] IN BLOOD BY AUTOMATED COUNT: 13.9 % (ref 12.3–15.4)
HCG INTACT+B SERPL-ACNC: <0.5 MIU/ML
HCT VFR BLD AUTO: 37.5 % (ref 34–46.6)
HGB BLD-MCNC: 11.6 G/DL (ref 12–15.9)
IMM GRANULOCYTES # BLD AUTO: 0.01 10*3/MM3 (ref 0–0.05)
IMM GRANULOCYTES NFR BLD AUTO: 0.2 % (ref 0–0.5)
LYMPHOCYTES # BLD AUTO: 2.22 10*3/MM3 (ref 0.7–3.1)
LYMPHOCYTES NFR BLD AUTO: 42.3 % (ref 19.6–45.3)
MCH RBC QN AUTO: 25.8 PG (ref 26.6–33)
MCHC RBC AUTO-ENTMCNC: 30.9 G/DL (ref 31.5–35.7)
MCV RBC AUTO: 83.3 FL (ref 79–97)
MONOCYTES # BLD AUTO: 0.31 10*3/MM3 (ref 0.1–0.9)
MONOCYTES NFR BLD AUTO: 5.9 % (ref 5–12)
NEUTROPHILS NFR BLD AUTO: 2.57 10*3/MM3 (ref 1.7–7)
NEUTROPHILS NFR BLD AUTO: 49 % (ref 42.7–76)
NRBC BLD AUTO-RTO: 0 /100 WBC (ref 0–0.2)
PLATELET # BLD AUTO: 313 10*3/MM3 (ref 140–450)
PMV BLD AUTO: 9.4 FL (ref 6–12)
RBC # BLD AUTO: 4.5 10*6/MM3 (ref 3.77–5.28)
WBC # BLD AUTO: 5.25 10*3/MM3 (ref 3.4–10.8)

## 2020-09-01 PROCEDURE — U0004 COV-19 TEST NON-CDC HGH THRU: HCPCS | Performed by: NURSE PRACTITIONER

## 2020-09-01 PROCEDURE — 85025 COMPLETE CBC W/AUTO DIFF WBC: CPT | Performed by: OBSTETRICS & GYNECOLOGY

## 2020-09-01 PROCEDURE — C9803 HOPD COVID-19 SPEC COLLECT: HCPCS | Performed by: NURSE PRACTITIONER

## 2020-09-01 PROCEDURE — 36415 COLL VENOUS BLD VENIPUNCTURE: CPT

## 2020-09-01 PROCEDURE — 84702 CHORIONIC GONADOTROPIN TEST: CPT | Performed by: OBSTETRICS & GYNECOLOGY

## 2020-09-01 NOTE — DISCHARGE INSTRUCTIONS
Take the following medications the morning of surgery:    NONE    ARRIVE TO OUTPT SURGERY AT 11 AM ON 9/3/2020      If you are on prescription narcotic pain medication to control your pain you may also take that medication the morning of surgery.    General Instructions:  • Do not eat solid food after midnight the night before surgery.  • You may drink clear liquids day of surgery but must stop at least one hour before your hospital arrival time.  • It is beneficial for you to have a clear drink that contains carbohydrates the day of surgery.  We suggest a 12 to 20 ounce bottle of Gatorade or Powerade for non-diabetic patients or a 12 to 20 ounce bottle of G2 or Powerade Zero for diabetic patients. (Pediatric patients, are not advised to drink a 12 to 20 ounce carbohydrate drink)    Clear liquids are liquids you can see through.  Nothing red in color.     Plain water                               Sports drinks  Sodas                                   Gelatin (Jell-O)  Fruit juices without pulp such as white grape juice and apple juice  Popsicles that contain no fruit or yogurt  Tea or coffee (no cream or milk added)  Gatorade / Powerade  G2 / Powerade Zero    • Infants may have breast milk up to four hours before surgery.  • Infants drinking formula may drink formula up to six hours before surgery.   • Patients who avoid smoking, chewing tobacco and alcohol for 4 weeks prior to surgery have a reduced risk of post-operative complications.  Quit smoking as many days before surgery as you can.  • Do not smoke, use chewing tobacco or drink alcohol the day of surgery.   • If applicable bring your C-PAP/ BI-PAP machine.  • Bring any papers given to you in the doctor’s office.  • Wear clean comfortable clothes.  • Do not wear contact lenses, false eyelashes or make-up.  Bring a case for your glasses.   • Bring crutches or walker if applicable.  • Remove all piercings.  Leave jewelry and any other valuables at home.  • Hair  extensions with metal clips must be removed prior to surgery.  • The Pre-Admission Testing nurse will instruct you to bring medications if unable to obtain an accurate list in Pre-Admission Testing.          Preventing a Surgical Site Infection:  • For 2 to 3 days before surgery, avoid shaving with a razor because the razor can irritate skin and make it easier to develop an infection.    • Any areas of open skin can increase the risk of a post-operative wound infection by allowing bacteria to enter and travel throughout the body.  Notify your surgeon if you have any skin wounds / rashes even if it is not near the expected surgical site.  The area will need assessed to determine if surgery should be delayed until it is healed.  • The night prior to surgery shower using a fresh bar of anti-bacterial soap (such as Dial) and clean washcloth.  Sleep in a clean bed with clean clothing.  Do not allow pets to sleep with you.  • Shower on the morning of surgery using a fresh bar of anti-bacterial soap (such as Dial) and clean washcloth.  Dry with a clean towel and dress in clean clothing.  • Ask your surgeon if you will be receiving antibiotics prior to surgery.  • Make sure you, your family, and all healthcare providers clean their hands with soap and water or an alcohol based hand  before caring for you or your wound.    Day of surgery:  Your arrival time is approximately two hours before your scheduled surgery time.  Upon arrival, a Pre-op nurse and Anesthesiologist will review your health history, obtain vital signs, and answer questions you may have.  The only belongings needed at this time will be a list of your home medications and if applicable your C-PAP/BI-PAP machine.  If you are staying overnight your family can leave the rest of your belongings in the car and bring them to your room later.  A Pre-op nurse will start an IV and you may receive medication in preparation for surgery, including something to  help you relax.  Your family will be able to see you in the Pre-op area.  Two visitors at a time will be allowed in the Pre-op room.  While you are in surgery your family should notify the waiting room  if they leave the waiting room area and provide a contact phone number.    Please be aware that surgery does come with discomfort.  We want to make every effort to control your discomfort so please discuss any uncontrolled symptoms with your nurse.   Your doctor will most likely have prescribed pain medications.      If you are going home after surgery you will receive individualized written care instructions before being discharged.  A responsible adult must drive you to and from the hospital on the day of your surgery and stay with you for 24 hours.    If you are staying overnight following surgery, you will be transported to your hospital room following the recovery period.  [facility] has all private rooms.    If you have any questions please call Pre-Admission Testing at [phone].  Deductibles and co-payments are collected on the day of service. Please be prepared to pay the required co-pay, deductible or deposit on the day of service as defined by your plan.    Patient Education for Self-Quarantine Process    Following your COVID testing, we strongly recommend that you do not leave your home after you have been tested for COVID except to get medical care. This includes not going to work, school or to public areas.  If this is not possible for you to do please limit your activities to only required outings.  Be sure to wear a mask when you are with other people, practice social distancing and wash your hands frequently.      The following items provide additional details to keep you safe.  • Wash your hands with soap and water frequently for at least 20 seconds.   • Avoid touching your eyes, nose and mouth with unwashed hands.  • Do not share anything - utensils, towels, food from  the same bowl.   • Have your own utensils, drinking glass, dishes, towels and bedding.   • Do not have visitors.   • Do use FaceTime to stay in touch with family and friends.  • You should stay in a specific room away from others if possible.   • Stay at least 6 feet away from others in the home if you cannot have a dedicated room to yourself.   • Do not snuggle with your pet. While the CDC says there is no evidence that pets can spread COVID-19 or be infected from humans, it is probably best to avoid “petting, snuggling, being kissed or licked and sharing food (during self-quarantine)”, according to the CDC.   • Sanitize household surfaces daily. Include all high touch areas (door handles, light switches, phones, countertops, etc.)  • Do not share a bathroom with others, if possible.   • Wear a mask around others in your home if you are unable to stay in a separate room or 6 feet apart. If  you are unable to wear a mask, have your family member wear a mask if they must be within 6 feet of you.   Call your surgeon immediately if you experience any of the following symptoms:  • Sore Throat  • Shortness of Breath or difficulty breathing  • Cough  • Chills  • Body soreness or muscle pain  • Headache  • Fever  • New loss of taste or smell  • Do not arrive for your surgery ill.  Your procedure will need to be rescheduled to another time.  You will need to call your physician before the day of surgery to avoid any unnecessary exposure to hospital staff as well as other patients.

## 2020-09-02 LAB — SARS-COV-2 RNA RESP QL NAA+PROBE: NOT DETECTED

## 2020-09-03 ENCOUNTER — ANESTHESIA (OUTPATIENT)
Dept: PERIOP | Facility: HOSPITAL | Age: 35
End: 2020-09-03

## 2020-09-03 ENCOUNTER — ANESTHESIA EVENT (OUTPATIENT)
Dept: PERIOP | Facility: HOSPITAL | Age: 35
End: 2020-09-03

## 2020-09-03 ENCOUNTER — HOSPITAL ENCOUNTER (OUTPATIENT)
Facility: HOSPITAL | Age: 35
Setting detail: HOSPITAL OUTPATIENT SURGERY
Discharge: HOME | End: 2020-09-03
Attending: OBSTETRICS & GYNECOLOGY | Admitting: OBSTETRICS & GYNECOLOGY

## 2020-09-03 VITALS
DIASTOLIC BLOOD PRESSURE: 62 MMHG | OXYGEN SATURATION: 100 % | SYSTOLIC BLOOD PRESSURE: 108 MMHG | TEMPERATURE: 97.8 F | HEART RATE: 108 BPM | RESPIRATION RATE: 16 BRPM

## 2020-09-03 DIAGNOSIS — N84.0 ENDOMETRIAL POLYP: ICD-10-CM

## 2020-09-03 PROCEDURE — 25010000003 CEFAZOLIN IN DEXTROSE 2-4 GM/100ML-% SOLUTION: Performed by: OBSTETRICS & GYNECOLOGY

## 2020-09-03 PROCEDURE — 88305 TISSUE EXAM BY PATHOLOGIST: CPT | Performed by: OBSTETRICS & GYNECOLOGY

## 2020-09-03 PROCEDURE — C1782 MORCELLATOR: HCPCS | Performed by: OBSTETRICS & GYNECOLOGY

## 2020-09-03 PROCEDURE — 25010000002 PROPOFOL 10 MG/ML EMULSION: Performed by: NURSE ANESTHETIST, CERTIFIED REGISTERED

## 2020-09-03 PROCEDURE — 25010000002 KETOROLAC TROMETHAMINE PER 15 MG: Performed by: NURSE ANESTHETIST, CERTIFIED REGISTERED

## 2020-09-03 PROCEDURE — 25010000002 FENTANYL CITRATE (PF) 100 MCG/2ML SOLUTION: Performed by: NURSE ANESTHETIST, CERTIFIED REGISTERED

## 2020-09-03 RX ORDER — NALBUPHINE HCL 10 MG/ML
2 AMPUL (ML) INJECTION EVERY 4 HOURS PRN
Status: DISCONTINUED | OUTPATIENT
Start: 2020-09-03 | End: 2020-09-03 | Stop reason: HOSPADM

## 2020-09-03 RX ORDER — HYDROCODONE BITARTRATE AND ACETAMINOPHEN 5; 325 MG/1; MG/1
1 TABLET ORAL ONCE AS NEEDED
Status: DISCONTINUED | OUTPATIENT
Start: 2020-09-03 | End: 2020-09-03 | Stop reason: HOSPADM

## 2020-09-03 RX ORDER — KETOROLAC TROMETHAMINE 30 MG/ML
INJECTION, SOLUTION INTRAMUSCULAR; INTRAVENOUS AS NEEDED
Status: DISCONTINUED | OUTPATIENT
Start: 2020-09-03 | End: 2020-09-03 | Stop reason: SURG

## 2020-09-03 RX ORDER — HYDRALAZINE HYDROCHLORIDE 20 MG/ML
5 INJECTION INTRAMUSCULAR; INTRAVENOUS
Status: DISCONTINUED | OUTPATIENT
Start: 2020-09-03 | End: 2020-09-03 | Stop reason: HOSPADM

## 2020-09-03 RX ORDER — LIDOCAINE HYDROCHLORIDE 10 MG/ML
0.5 INJECTION, SOLUTION EPIDURAL; INFILTRATION; INTRACAUDAL; PERINEURAL ONCE AS NEEDED
Status: DISCONTINUED | OUTPATIENT
Start: 2020-09-03 | End: 2020-09-03 | Stop reason: HOSPADM

## 2020-09-03 RX ORDER — FENTANYL CITRATE 50 UG/ML
25 INJECTION, SOLUTION INTRAMUSCULAR; INTRAVENOUS
Status: DISCONTINUED | OUTPATIENT
Start: 2020-09-03 | End: 2020-09-03 | Stop reason: HOSPADM

## 2020-09-03 RX ORDER — ACETAMINOPHEN 500 MG
500 TABLET ORAL ONCE
Status: COMPLETED | OUTPATIENT
Start: 2020-09-03 | End: 2020-09-03

## 2020-09-03 RX ORDER — ACETAMINOPHEN 325 MG/1
650 TABLET ORAL ONCE AS NEEDED
Status: DISCONTINUED | OUTPATIENT
Start: 2020-09-03 | End: 2020-09-03 | Stop reason: HOSPADM

## 2020-09-03 RX ORDER — NALOXONE HCL 0.4 MG/ML
0.4 VIAL (ML) INJECTION AS NEEDED
Status: DISCONTINUED | OUTPATIENT
Start: 2020-09-03 | End: 2020-09-03 | Stop reason: HOSPADM

## 2020-09-03 RX ORDER — DIPHENHYDRAMINE HYDROCHLORIDE 50 MG/ML
12.5 INJECTION INTRAMUSCULAR; INTRAVENOUS
Status: DISCONTINUED | OUTPATIENT
Start: 2020-09-03 | End: 2020-09-03 | Stop reason: HOSPADM

## 2020-09-03 RX ORDER — SODIUM CHLORIDE 9 MG/ML
INJECTION, SOLUTION INTRAVENOUS AS NEEDED
Status: DISCONTINUED | OUTPATIENT
Start: 2020-09-03 | End: 2020-09-03 | Stop reason: HOSPADM

## 2020-09-03 RX ORDER — NALBUPHINE HCL 10 MG/ML
10 AMPUL (ML) INJECTION EVERY 4 HOURS PRN
Status: DISCONTINUED | OUTPATIENT
Start: 2020-09-03 | End: 2020-09-03 | Stop reason: HOSPADM

## 2020-09-03 RX ORDER — FENTANYL CITRATE 50 UG/ML
INJECTION, SOLUTION INTRAMUSCULAR; INTRAVENOUS AS NEEDED
Status: DISCONTINUED | OUTPATIENT
Start: 2020-09-03 | End: 2020-09-03 | Stop reason: SURG

## 2020-09-03 RX ORDER — SODIUM CHLORIDE, SODIUM LACTATE, POTASSIUM CHLORIDE, CALCIUM CHLORIDE 600; 310; 30; 20 MG/100ML; MG/100ML; MG/100ML; MG/100ML
9 INJECTION, SOLUTION INTRAVENOUS CONTINUOUS
Status: DISCONTINUED | OUTPATIENT
Start: 2020-09-03 | End: 2020-09-03 | Stop reason: HOSPADM

## 2020-09-03 RX ORDER — SODIUM CHLORIDE 0.9 % (FLUSH) 0.9 %
10 SYRINGE (ML) INJECTION EVERY 12 HOURS SCHEDULED
Status: DISCONTINUED | OUTPATIENT
Start: 2020-09-03 | End: 2020-09-03 | Stop reason: HOSPADM

## 2020-09-03 RX ORDER — MIDAZOLAM HYDROCHLORIDE 1 MG/ML
1 INJECTION INTRAMUSCULAR; INTRAVENOUS
Status: DISCONTINUED | OUTPATIENT
Start: 2020-09-03 | End: 2020-09-03 | Stop reason: HOSPADM

## 2020-09-03 RX ORDER — CEFAZOLIN SODIUM 2 G/100ML
2 INJECTION, SOLUTION INTRAVENOUS ONCE
Status: COMPLETED | OUTPATIENT
Start: 2020-09-03 | End: 2020-09-03

## 2020-09-03 RX ORDER — PROPOFOL 10 MG/ML
VIAL (ML) INTRAVENOUS AS NEEDED
Status: DISCONTINUED | OUTPATIENT
Start: 2020-09-03 | End: 2020-09-03 | Stop reason: SURG

## 2020-09-03 RX ORDER — SODIUM CHLORIDE 0.9 % (FLUSH) 0.9 %
10 SYRINGE (ML) INJECTION AS NEEDED
Status: DISCONTINUED | OUTPATIENT
Start: 2020-09-03 | End: 2020-09-03 | Stop reason: HOSPADM

## 2020-09-03 RX ORDER — ACETAMINOPHEN 650 MG/1
650 SUPPOSITORY RECTAL ONCE AS NEEDED
Status: DISCONTINUED | OUTPATIENT
Start: 2020-09-03 | End: 2020-09-03 | Stop reason: HOSPADM

## 2020-09-03 RX ORDER — LIDOCAINE HYDROCHLORIDE 20 MG/ML
INJECTION, SOLUTION INFILTRATION; PERINEURAL AS NEEDED
Status: DISCONTINUED | OUTPATIENT
Start: 2020-09-03 | End: 2020-09-03 | Stop reason: SURG

## 2020-09-03 RX ADMIN — CEFAZOLIN SODIUM 2 G: 2 INJECTION, SOLUTION INTRAVENOUS at 14:18

## 2020-09-03 RX ADMIN — ACETAMINOPHEN 500 MG: 500 TABLET ORAL at 13:00

## 2020-09-03 RX ADMIN — LIDOCAINE HYDROCHLORIDE 60 MG: 20 INJECTION, SOLUTION INFILTRATION; PERINEURAL at 14:13

## 2020-09-03 RX ADMIN — SODIUM CHLORIDE, POTASSIUM CHLORIDE, SODIUM LACTATE AND CALCIUM CHLORIDE 9 ML/HR: 600; 310; 30; 20 INJECTION, SOLUTION INTRAVENOUS at 13:00

## 2020-09-03 RX ADMIN — FENTANYL CITRATE 50 MCG: 50 INJECTION INTRAMUSCULAR; INTRAVENOUS at 14:13

## 2020-09-03 RX ADMIN — PROPOFOL 200 MG: 10 INJECTION, EMULSION INTRAVENOUS at 14:13

## 2020-09-03 RX ADMIN — FENTANYL CITRATE 50 MCG: 50 INJECTION INTRAMUSCULAR; INTRAVENOUS at 14:28

## 2020-09-03 RX ADMIN — KETOROLAC TROMETHAMINE 30 MG: 30 INJECTION, SOLUTION INTRAMUSCULAR; INTRAVENOUS at 14:34

## 2020-09-03 NOTE — ANESTHESIA PROCEDURE NOTES
Airway  Urgency: elective    Date/Time: 9/3/2020 2:13 PM  Airway not difficult    General Information and Staff    Patient location during procedure: OR  CRNA: Deandra Nolan CRNA    Indications and Patient Condition  Indications for airway management: airway protection    Preoxygenated: yes  Mask difficulty assessment: 0 - not attempted    Final Airway Details  Final airway type: supraglottic airway      Successful airway: classic  Size 4    Number of attempts at approach: 1  Assessment: lips, teeth, and gum same as pre-op and atraumatic intubation    Additional Comments  LMA inserted without difficulty.  Lips and teeth intact as preop condition.  No signs or symptoms of gastric regurgitation.  Seal with minimal pressure and secure.

## 2020-09-03 NOTE — OP NOTE
PREOPERATIVE DIAGNOSIS: A 35-year-old  0 para 0 with endometrial polyp    POSTOPERATIVE DIAGNOSES:same as above    PROCEDURES PERFORMED: 1. operative hysteroscopy with Myosure polypectomy.     SURGEON: Cari Ovalle MD     ASSISTANT: non     ANESTHESIA: General.     ESTIMATED BLOOD LOSS: 10 mL.     FLUIDS: 600 mL Crystalloid.     URINE OUTPUT: not measured    HSC DEFICI: 50 mls     FINDINGS: large endometrial polyp from right/anterior cavity    SPECIMENS:endometrial polyp    COMPLICATIONS: none noted    CONDITION: stable     INDICATIONS: The patient is a 35 y o G0 with large endometrial polyp on saline sonogram. Decision made to proceed with hysteroscopy/polypectomy. The procedure itself as well as indications, risks, benefits, and alternatives of the procedure were discussed with   the patient in the office and again preoperatively. Risks include but are not limited to infection, bleeding, possibly requiring blood transfusion with subsequent risk of infection of bloodborne pathogens such as HIV or hepatitis, damage to adjacent structures and uterine perforation such as damage to bowel, bladder, vessels, nerves, or the ureters requiring further surgery, general postoperative and anesthetic complications, Asherman syndrome. The patient is aware of all of the above. She desired to proceed and she signed her informed consent.     DESCRIPTION OF PROCEDURE: The patient was taken to the operating room where general anesthesia was obtained. She was then prepped and draped in the usual sterile fashion in the dorsal lithotomy position with the feet in the yellowfin stirrups. Adequate timeout was performed with all members of the team present in the room. PAS boots were in place and the patient had received antibiotic prophylaxis with Kefzol. While double gloved, a bivalve speculum was placed in the vagina. The anterior lip of the cervix was visualized and grasped with an Allis clamp. The cervix was then gently  dilated up to a #17 dilator and the omnihysteroscope was inserted into the uterus under direct visualization, findings are as noted above. Using the myosure reach, the polyp was removed. Good hemostasis noted and cavity appeared normal. At this point hysteroscope was removed.  The instruments were removed from the vagina. Good hemostasis was noted at thecervix. The patient tolerated the procedure well and was taken to the recovery room in stable condition. All counts were reported as correct x2 at the end of the case

## 2020-09-03 NOTE — ANESTHESIA PREPROCEDURE EVALUATION
Anesthesia Evaluation     no history of anesthetic complications:  NPO Solid Status: > 8 hours             Airway   Mallampati: I  TM distance: >3 FB  Neck ROM: full  No difficulty expected  Dental - normal exam     Pulmonary - negative pulmonary ROS and normal exam   Cardiovascular - negative cardio ROS and normal exam        Neuro/Psych- negative ROS  GI/Hepatic/Renal/Endo - negative ROS     Musculoskeletal (-) negative ROS    Abdominal  - normal exam   Substance History - negative use     OB/GYN          Other - negative ROS                       Anesthesia Plan    ASA 1     general   (  D/W R&B of GA including but not limited to: heart, lung, liver, kidney, neurologic problems, positioning injuries, dental damage, corneal abrasion and TMJ.  .    She does NOT want intraoperative antiemetics)  intravenous induction

## 2020-09-03 NOTE — ANESTHESIA POSTPROCEDURE EVALUATION
Patient: Gustabo Gil    Procedure Summary     Date:  09/03/20 Room / Location:   [facility]  [facility]    Anesthesia Start:  1408 Anesthesia Stop:  1446    Procedure:  HYSTEROSCOPY,POLYPECTOMY with myosure (N/A Uterus) Diagnosis:      Surgeon:  Cari Ovalle MD Provider:  Sourav Hu MD    Anesthesia Type:  general ASA Status:  1          Anesthesia Type: general    Vitals  Vitals Value Taken Time   /87 9/3/2020  3:15 PM   Temp 36.6 °C (97.8 °F) 9/3/2020  3:00 PM   Pulse 60 9/3/2020  3:15 PM   Resp 16 9/3/2020  3:15 PM   SpO2 100 % 9/3/2020  3:17 PM   Vitals shown include unvalidated device data.        Post Anesthesia Care and Evaluation    Patient location during evaluation: PACU  Patient participation: complete - patient participated  Level of consciousness: awake  Pain management: adequate  Airway patency: patent  Anesthetic complications: No anesthetic complications    Cardiovascular status: acceptable  Respiratory status: acceptable  Hydration status: acceptable    Comments: /87   Pulse 60   Temp 36.6 °C (97.8 °F) (Temporal)   Resp 16   LMP 08/30/2020 Comment: serum hcg on 9/1/2020  SpO2 100%

## 2020-09-03 NOTE — INTERVAL H&P NOTE
Addendum: Patient reports no significant changes and desires to proceed with the above. All questions answered.

## 2020-09-04 LAB
CYTO UR: NORMAL
LAB AP CASE REPORT: NORMAL
PATH REPORT.FINAL DX SPEC: NORMAL
PATH REPORT.GROSS SPEC: NORMAL

## 2020-11-09 RX ORDER — VITAMIN A, ASCORBIC ACID, CHOLECALCIFEROL, TOCOPHEROL, THIAMINE MONONITRATE, RIBOFLAVIN, PYRIDOXINE, FOLIC ACID, CYANOCOBALAMIN, CALCIUM CARBONATE, FERROUS FUMARATE, ZINC OXIDE, CUPRIC OXIDE, NIACINAMIDE, AND FISH OIL 27-1-250MG
KIT ORAL
Qty: 60 EACH | Refills: 0 | Status: SHIPPED | OUTPATIENT
Start: 2020-11-09 | End: 2021-05-03

## 2020-11-23 ENCOUNTER — TELEPHONE (OUTPATIENT)
Dept: OBSTETRICS AND GYNECOLOGY | Age: 35
End: 2020-11-23

## 2020-11-23 DIAGNOSIS — N92.6 MISSED MENSES: Primary | ICD-10-CM

## 2020-11-23 DIAGNOSIS — N92.6 LATE MENSES: Primary | ICD-10-CM

## 2020-11-23 NOTE — TELEPHONE ENCOUNTER
(Donnie Pt)       Pt called stating she would like to come in for a blood test today to check if she is pregnant. Pt states she took a hpt and was negative but is a day late getting her menstrual cycle.     Please advise.     [phone]

## 2020-11-24 ENCOUNTER — TELEPHONE (OUTPATIENT)
Dept: OBSTETRICS AND GYNECOLOGY | Age: 35
End: 2020-11-24

## 2020-11-24 LAB
HCG INTACT+B SERPL-ACNC: <0.5 MIU/ML
PROGEST SERPL-MCNC: 0.3 NG/ML

## 2020-11-24 NOTE — TELEPHONE ENCOUNTER
----- Message from Cassandra Fields MD sent at 11/24/2020  8:47 AM EST -----  Please call Gustabo, her HCG is negative

## 2020-11-28 ENCOUNTER — RESULTS ENCOUNTER (OUTPATIENT)
Dept: OBSTETRICS AND GYNECOLOGY | Age: 35
End: 2020-11-28

## 2020-11-28 DIAGNOSIS — N92.6 LATE MENSES: ICD-10-CM

## 2021-03-18 ENCOUNTER — OFFICE VISIT (OUTPATIENT)
Dept: OBSTETRICS AND GYNECOLOGY | Age: 36
End: 2021-03-18

## 2021-03-18 VITALS
DIASTOLIC BLOOD PRESSURE: 52 MMHG | BODY MASS INDEX: 28.85 KG/M2 | HEIGHT: 64 IN | WEIGHT: 169 LBS | SYSTOLIC BLOOD PRESSURE: 90 MMHG

## 2021-03-18 DIAGNOSIS — Z13.29 SCREENING FOR THYROID DISORDER: ICD-10-CM

## 2021-03-18 DIAGNOSIS — N91.2 ABSENCE OF MENSTRUATION: Primary | ICD-10-CM

## 2021-03-18 DIAGNOSIS — R82.71 ASYMPTOMATIC BACTERIURIA IN PREGNANCY: ICD-10-CM

## 2021-03-18 DIAGNOSIS — Z98.890 HISTORY OF LOOP ELECTROSURGICAL EXCISION PROCEDURE (LEEP) OF CERVIX AFFECTING PREGNANCY, ANTEPARTUM: ICD-10-CM

## 2021-03-18 DIAGNOSIS — O09.529 ANTEPARTUM MULTIGRAVIDA OF ADVANCED MATERNAL AGE: ICD-10-CM

## 2021-03-18 DIAGNOSIS — O34.40 HISTORY OF LOOP ELECTROSURGICAL EXCISION PROCEDURE (LEEP) OF CERVIX AFFECTING PREGNANCY, ANTEPARTUM: ICD-10-CM

## 2021-03-18 DIAGNOSIS — Z67.91 BLOOD TYPE, RH NEGATIVE: ICD-10-CM

## 2021-03-18 DIAGNOSIS — O36.80X0 PREGNANCY WITH UNCERTAIN FETAL VIABILITY, SINGLE OR UNSPECIFIED FETUS: ICD-10-CM

## 2021-03-18 DIAGNOSIS — O99.891 ASYMPTOMATIC BACTERIURIA IN PREGNANCY: ICD-10-CM

## 2021-03-18 DIAGNOSIS — A60.04 HERPES SIMPLEX VULVOVAGINITIS: ICD-10-CM

## 2021-03-18 PROBLEM — N84.0 ENDOMETRIAL POLYP: Status: RESOLVED | Noted: 2020-06-16 | Resolved: 2021-03-18

## 2021-03-18 PROBLEM — N83.201 CYST OF RIGHT OVARY: Status: RESOLVED | Noted: 2020-06-16 | Resolved: 2021-03-18

## 2021-03-18 LAB
B-HCG UR QL: POSITIVE
INTERNAL NEGATIVE CONTROL: NEGATIVE
INTERNAL POSITIVE CONTROL: POSITIVE
Lab: ABNORMAL

## 2021-03-18 PROCEDURE — 81025 URINE PREGNANCY TEST: CPT | Performed by: OBSTETRICS & GYNECOLOGY

## 2021-03-18 PROCEDURE — 99213 OFFICE O/P EST LOW 20 MIN: CPT | Performed by: OBSTETRICS & GYNECOLOGY

## 2021-03-18 NOTE — PROGRESS NOTES
"Chief complaint:early pregnancy u/s    HPI  Gustabo Jacome is a 36 y.o. female presents for early pregnancy u/s. Pt denies vag bleeding or pelvic pain. She has mild nausea but denies emesis. She is an  currently. She did not get the covid vaccine.         The following portions of the patient's history were reviewed and updated as appropriate: allergies, current medications, past family history, past medical history, past social history, past surgical history and problem list.    Review of Systems  Constitutional: negative for fevers  Gastrointestinal: positive for nausea, negative for vomiting  Genitourinary:negative for vaginal bleeding or pelvic pain  Integument/breast: positive for breast tenderness    BP 90/52   Ht 161.9 cm (63.75\")   Wt 76.7 kg (169 lb)   LMP 01/18/2021 (Exact Date)   Breastfeeding No   BMI 29.24 kg/m²         Physical Exam  Constitutional:       Appearance: Normal appearance.   Pulmonary:      Effort: Pulmonary effort is normal.   Neurological:      General: No focal deficit present.      Mental Status: She is alert and oriented to person, place, and time.   Psychiatric:         Mood and Affect: Mood normal.         Behavior: Behavior normal.     tv u/s: viable IUP with size consistent with dates        Diagnoses and all orders for this visit:    1. Absence of menstruation (Primary)  -     POC Pregnancy, Urine  -     TSH  -     OB Panel With HIV  -     Urine Culture - Urine, Urine, Clean Catch    2. Pregnancy with uncertain fetal viability, single or unspecified fetus  -     OB Panel With HIV    3. Asymptomatic bacteriuria in pregnancy  -     Urine Culture - Urine, Urine, Clean Catch    4. Screening for thyroid disorder  -     TSH    5. Antepartum multigravida of advanced maternal age    6. Blood type, Rh negative    7. History of loop electrosurgical excision procedure (LEEP) of cervix affecting pregnancy, antepartum    8. Herpes simplex vulvovaginitis      Reviewed " u/s findings and pnguidelines. Discussed group coverage and recommendations to avoid exposures including toxoplasmosis and listeria. All questions answered.     Rh neg: reviewed indications for rhogam and provided handout    AMA: pt declines aneuploidy or carrier testing. Plan targeted u/s and she agrees    Hx of LEEP: plan cervical length around 16 weeks and pt advised    Hx of HSV: discussed indications for c/s if outbreak or symptoms at delivery. Recommend anti-viral prophylaxis at term. Pt notes understanding.     F/u 3 weeks for OB intake.

## 2021-03-19 ENCOUNTER — TELEPHONE (OUTPATIENT)
Dept: OBSTETRICS AND GYNECOLOGY | Age: 36
End: 2021-03-19

## 2021-03-19 LAB
ABO GROUP BLD: ABNORMAL
BASOPHILS # BLD AUTO: 0.1 X10E3/UL (ref 0–0.2)
BASOPHILS NFR BLD AUTO: 1 %
BLD GP AB SCN SERPL QL: NEGATIVE
EOSINOPHIL # BLD AUTO: 0 X10E3/UL (ref 0–0.4)
EOSINOPHIL NFR BLD AUTO: 1 %
ERYTHROCYTE [DISTWIDTH] IN BLOOD BY AUTOMATED COUNT: 13.7 % (ref 11.7–15.4)
HBV SURFACE AG SERPL QL IA: NEGATIVE
HCT VFR BLD AUTO: 39.6 % (ref 34–46.6)
HCV AB S/CO SERPL IA: <0.1 S/CO RATIO (ref 0–0.9)
HGB BLD-MCNC: 12.8 G/DL (ref 11.1–15.9)
HIV 1+2 AB+HIV1 P24 AG SERPL QL IA: NON REACTIVE
IMM GRANULOCYTES # BLD AUTO: 0 X10E3/UL (ref 0–0.1)
IMM GRANULOCYTES NFR BLD AUTO: 0 %
LYMPHOCYTES # BLD AUTO: 1.5 X10E3/UL (ref 0.7–3.1)
LYMPHOCYTES NFR BLD AUTO: 19 %
MCH RBC QN AUTO: 26.3 PG (ref 26.6–33)
MCHC RBC AUTO-ENTMCNC: 32.3 G/DL (ref 31.5–35.7)
MCV RBC AUTO: 81 FL (ref 79–97)
MONOCYTES # BLD AUTO: 0.4 X10E3/UL (ref 0.1–0.9)
MONOCYTES NFR BLD AUTO: 5 %
NEUTROPHILS # BLD AUTO: 6 X10E3/UL (ref 1.4–7)
NEUTROPHILS NFR BLD AUTO: 74 %
PLATELET # BLD AUTO: 363 X10E3/UL (ref 150–450)
RBC # BLD AUTO: 4.87 X10E6/UL (ref 3.77–5.28)
RH BLD: NEGATIVE
RPR SER QL: NON REACTIVE
RUBV IGG SERPL IA-ACNC: 7.66 INDEX
TSH SERPL DL<=0.005 MIU/L-ACNC: 1.03 UIU/ML (ref 0.27–4.2)
WBC # BLD AUTO: 8 X10E3/UL (ref 3.4–10.8)

## 2021-03-19 NOTE — TELEPHONE ENCOUNTER
O negative and pt A negative she is asking if she still need to take shot Rhogam at 28 weeks if their both negative. Please advise

## 2021-03-19 NOTE — TELEPHONE ENCOUNTER
Called pt and discussed that she can decline rhogam if she has documentation that the FOB is Rh negative.

## 2021-03-19 NOTE — TELEPHONE ENCOUNTER
----- Message from Cassandra Fields MD sent at 3/19/2021  1:10 PM EDT -----  Please call Gustabo, her prenatal blood tests are essentially normal. Her blood type is A negative so she will need rhogam at 28 weeks or with bleeding/trauma.

## 2021-03-20 LAB
BACTERIA UR CULT: NORMAL
BACTERIA UR CULT: NORMAL

## 2021-04-09 ENCOUNTER — ROUTINE PRENATAL (OUTPATIENT)
Dept: OBSTETRICS AND GYNECOLOGY | Age: 36
End: 2021-04-09

## 2021-04-09 VITALS — BODY MASS INDEX: 29.06 KG/M2 | SYSTOLIC BLOOD PRESSURE: 112 MMHG | WEIGHT: 168 LBS | DIASTOLIC BLOOD PRESSURE: 72 MMHG

## 2021-04-09 DIAGNOSIS — Z34.81 PRENATAL CARE, SUBSEQUENT PREGNANCY, FIRST TRIMESTER: Primary | ICD-10-CM

## 2021-04-09 NOTE — PROGRESS NOTES
Pt arrived for appt but left without being seen. She noted she was fatigued today and did not want to wait for appt. She was triaged by MA but declined to provide urine sample.     Contacted pt by phone. Discussed that she is due for aneuploidy screening today. She reports she will re-book her appt.

## 2021-04-12 ENCOUNTER — ROUTINE PRENATAL (OUTPATIENT)
Dept: OBSTETRICS AND GYNECOLOGY | Age: 36
End: 2021-04-12

## 2021-04-12 VITALS — BODY MASS INDEX: 29.31 KG/M2 | WEIGHT: 169.4 LBS | SYSTOLIC BLOOD PRESSURE: 104 MMHG | DIASTOLIC BLOOD PRESSURE: 64 MMHG

## 2021-04-12 DIAGNOSIS — Z3A.12 12 WEEKS GESTATION OF PREGNANCY: Primary | ICD-10-CM

## 2021-04-12 DIAGNOSIS — A60.04 HERPES SIMPLEX VULVOVAGINITIS: ICD-10-CM

## 2021-04-12 DIAGNOSIS — Z98.890 HISTORY OF LOOP ELECTROSURGICAL EXCISION PROCEDURE (LEEP) OF CERVIX AFFECTING PREGNANCY, ANTEPARTUM: ICD-10-CM

## 2021-04-12 DIAGNOSIS — Z67.91 BLOOD TYPE, RH NEGATIVE: ICD-10-CM

## 2021-04-12 DIAGNOSIS — O09.529 ANTEPARTUM MULTIGRAVIDA OF ADVANCED MATERNAL AGE: ICD-10-CM

## 2021-04-12 DIAGNOSIS — O34.40 HISTORY OF LOOP ELECTROSURGICAL EXCISION PROCEDURE (LEEP) OF CERVIX AFFECTING PREGNANCY, ANTEPARTUM: ICD-10-CM

## 2021-04-12 PROBLEM — D57.3 SICKLE CELL TRAIT: Status: ACTIVE | Noted: 2021-04-12

## 2021-04-12 LAB
CLARITY, POC: CLEAR
COLOR UR: YELLOW
EXTERNAL CYSTIC FIBROSIS: NORMAL
GLUCOSE UR STRIP-MCNC: NEGATIVE MG/DL
PROT UR STRIP-MCNC: NEGATIVE MG/DL
VZV IGG SER QL: NORMAL

## 2021-04-12 PROCEDURE — 0501F PRENATAL FLOW SHEET: CPT | Performed by: OBSTETRICS & GYNECOLOGY

## 2021-04-13 LAB
HGB A MFR BLD ELPH: 60.7 % (ref 96.4–98.8)
HGB A2 MFR BLD ELPH: 3 % (ref 1.8–3.2)
HGB F MFR BLD ELPH: 0 % (ref 0–2)
HGB FRACT BLD-IMP: ABNORMAL
HGB FRACT BLD-IMP: ABNORMAL
HGB S BLD QL SOLY: POSITIVE
HGB S MFR BLD ELPH: 36.3 %

## 2021-04-14 LAB
BACTERIA UR CULT: NORMAL
BACTERIA UR CULT: NORMAL

## 2021-04-16 ENCOUNTER — BULK ORDERING (OUTPATIENT)
Dept: CASE MANAGEMENT | Facility: OTHER | Age: 36
End: 2021-04-16

## 2021-04-16 DIAGNOSIS — Z23 IMMUNIZATION DUE: ICD-10-CM

## 2021-05-03 RX ORDER — VITAMIN A, ASCORBIC ACID, CHOLECALCIFEROL, TOCOPHEROL, THIAMINE MONONITRATE, RIBOFLAVIN, PYRIDOXINE, FOLIC ACID, CYANOCOBALAMIN, CALCIUM CARBONATE, FERROUS FUMARATE, ZINC OXIDE, CUPRIC OXIDE, NIACINAMIDE, AND FISH OIL 27-1-250MG
KIT ORAL
Qty: 60 EACH | Refills: 12 | Status: SHIPPED | OUTPATIENT
Start: 2021-05-03 | End: 2022-10-17 | Stop reason: SDUPTHER

## 2021-05-10 ENCOUNTER — TELEPHONE (OUTPATIENT)
Dept: OBSTETRICS AND GYNECOLOGY | Age: 36
End: 2021-05-10

## 2021-05-11 ENCOUNTER — ROUTINE PRENATAL (OUTPATIENT)
Dept: OBSTETRICS AND GYNECOLOGY | Age: 36
End: 2021-05-11

## 2021-05-11 VITALS — BODY MASS INDEX: 30.31 KG/M2 | SYSTOLIC BLOOD PRESSURE: 98 MMHG | DIASTOLIC BLOOD PRESSURE: 60 MMHG | WEIGHT: 175.2 LBS

## 2021-05-11 DIAGNOSIS — Z3A.16 16 WEEKS GESTATION OF PREGNANCY: Primary | ICD-10-CM

## 2021-05-11 LAB
CLARITY, POC: CLEAR
COLOR UR: YELLOW
EXTERNAL NIPT: NORMAL
GLUCOSE UR STRIP-MCNC: NEGATIVE MG/DL
PROT UR STRIP-MCNC: NEGATIVE MG/DL

## 2021-05-11 PROCEDURE — 0502F SUBSEQUENT PRENATAL CARE: CPT | Performed by: OBSTETRICS & GYNECOLOGY

## 2021-05-11 NOTE — PROGRESS NOTES
Pt presents for routine visit. She denies bleeding/pain/n/v.  U/s: cervical length is 4.78 cm    A/p: 16 weeks: recommended AFP and pt declines today    AMA: pt had low risk aneuploidy screen. She declines targeted u/s. Plan anatomy at f/u with early glucose. Recommended baby asa again. Pt reports she will consider.     Rh neg: pt reports spouse is rh neg and declines rhogam

## 2021-06-07 ENCOUNTER — APPOINTMENT (OUTPATIENT)
Dept: ULTRASOUND IMAGING | Facility: HOSPITAL | Age: 36
End: 2021-06-07

## 2021-06-23 ENCOUNTER — ROUTINE PRENATAL (OUTPATIENT)
Dept: OBSTETRICS AND GYNECOLOGY | Age: 36
End: 2021-06-23

## 2021-06-23 VITALS — WEIGHT: 180.2 LBS | SYSTOLIC BLOOD PRESSURE: 100 MMHG | DIASTOLIC BLOOD PRESSURE: 58 MMHG | BODY MASS INDEX: 31.17 KG/M2

## 2021-06-23 DIAGNOSIS — Z67.91 BLOOD TYPE, RH NEGATIVE: ICD-10-CM

## 2021-06-23 DIAGNOSIS — O09.529 ANTEPARTUM MULTIGRAVIDA OF ADVANCED MATERNAL AGE: ICD-10-CM

## 2021-06-23 DIAGNOSIS — Z13.0 SCREENING FOR IRON DEFICIENCY ANEMIA: ICD-10-CM

## 2021-06-23 DIAGNOSIS — D57.3 SICKLE CELL TRAIT (HCC): ICD-10-CM

## 2021-06-23 DIAGNOSIS — Z13.1 SCREENING FOR DIABETES MELLITUS: ICD-10-CM

## 2021-06-23 DIAGNOSIS — Z3A.22 22 WEEKS GESTATION OF PREGNANCY: Primary | ICD-10-CM

## 2021-06-23 LAB
2ND TRIMESTER 4 SCREEN SERPL-IMP: NORMAL
AFP INTERP SERPL-IMP: NORMAL
BASOPHILS # BLD AUTO: 0.04 10*3/MM3 (ref 0–0.2)
BASOPHILS NFR BLD AUTO: 0.4 % (ref 0–1.5)
CLARITY, POC: CLEAR
COLOR UR: YELLOW
EOSINOPHIL # BLD AUTO: 0.06 10*3/MM3 (ref 0–0.4)
EOSINOPHIL NFR BLD AUTO: 0.6 % (ref 0.3–6.2)
ERYTHROCYTE [DISTWIDTH] IN BLOOD BY AUTOMATED COUNT: 13.5 % (ref 12.3–15.4)
GLUCOSE 1H P 50 G GLC PO SERPL-MCNC: 100 MG/DL (ref 65–139)
GLUCOSE UR STRIP-MCNC: NEGATIVE MG/DL
HCT VFR BLD AUTO: 35.7 % (ref 34–46.6)
HGB BLD-MCNC: 11.9 G/DL (ref 12–15.9)
IMM GRANULOCYTES # BLD AUTO: 0.1 10*3/MM3 (ref 0–0.05)
IMM GRANULOCYTES NFR BLD AUTO: 1.1 % (ref 0–0.5)
LYMPHOCYTES # BLD AUTO: 1.59 10*3/MM3 (ref 0.7–3.1)
LYMPHOCYTES NFR BLD AUTO: 17 % (ref 19.6–45.3)
MCH RBC QN AUTO: 26.9 PG (ref 26.6–33)
MCHC RBC AUTO-ENTMCNC: 33.3 G/DL (ref 31.5–35.7)
MCV RBC AUTO: 80.8 FL (ref 79–97)
MONOCYTES # BLD AUTO: 0.48 10*3/MM3 (ref 0.1–0.9)
MONOCYTES NFR BLD AUTO: 5.1 % (ref 5–12)
NEUTROPHILS # BLD AUTO: 7.08 10*3/MM3 (ref 1.7–7)
NEUTROPHILS NFR BLD AUTO: 75.8 % (ref 42.7–76)
NRBC BLD AUTO-RTO: 0 /100 WBC (ref 0–0.2)
PLATELET # BLD AUTO: 276 10*3/MM3 (ref 140–450)
PROT UR STRIP-MCNC: NEGATIVE MG/DL
RBC # BLD AUTO: 4.42 10*6/MM3 (ref 3.77–5.28)
WBC # BLD AUTO: 9.35 10*3/MM3 (ref 3.4–10.8)

## 2021-06-23 PROCEDURE — 0502F SUBSEQUENT PRENATAL CARE: CPT | Performed by: OBSTETRICS & GYNECOLOGY

## 2021-06-23 NOTE — PROGRESS NOTES
Pt presents for visit and anatomy u/s  She denies regular ctx, vaginal bleeding or leaking fluid. She is feeling fetal movement  She complains of occ carpal tunnel symptoms    O: anatomy u/s: normal completed anatomy, cervical length over 6 cm    A/p: AMA: pt declined targeted u/s. Plan growth u/s at 28 weeks. Baby asa recommended previously but pt notes she has not started. Recommended she consider starting now. Early one hour today    Rh neg: reviewed rhogam indications. Pt declines rhogam noting spouse is Rh neg. Recommended she bring documentation to confirm diagnosis. Otherwise, rhogam recommended at 28 weeks    Sickle cell trait: repeat ucx ordered today    Reviewed supportive care for carpal tunnel    22 weeks: discussed PTL warnings, rtc 4 weeks, repeat one hour in 6 weeks

## 2021-06-25 ENCOUNTER — TELEPHONE (OUTPATIENT)
Dept: OBSTETRICS AND GYNECOLOGY | Age: 36
End: 2021-06-25

## 2021-06-25 LAB
BACTERIA UR CULT: NO GROWTH
BACTERIA UR CULT: NORMAL

## 2021-06-25 NOTE — TELEPHONE ENCOUNTER
----- Message from Cassandra Fields MD sent at 6/24/2021  5:48 PM EDT -----  Called pt to discuss her labs. No answer. Please advise she passed her early glucose screen. She has mild anemia, recommend she add an iron supplement daily. Iron sulfate 325 mg every-other -day. Recommend she take the iron at another time from vitamin to aid absorption.

## 2021-06-25 NOTE — TELEPHONE ENCOUNTER
----- Message from Cassandra Fields MD sent at 6/25/2021  8:57 AM EDT -----  Please call Gustabo, her urine culture is negative

## 2021-07-23 ENCOUNTER — ROUTINE PRENATAL (OUTPATIENT)
Dept: OBSTETRICS AND GYNECOLOGY | Age: 36
End: 2021-07-23

## 2021-07-23 VITALS — WEIGHT: 180.6 LBS | SYSTOLIC BLOOD PRESSURE: 100 MMHG | DIASTOLIC BLOOD PRESSURE: 62 MMHG | BODY MASS INDEX: 31.24 KG/M2

## 2021-07-23 DIAGNOSIS — Z3A.26 26 WEEKS GESTATION OF PREGNANCY: Primary | ICD-10-CM

## 2021-07-23 LAB
CLARITY, POC: CLEAR
COLOR UR: YELLOW
GLUCOSE UR STRIP-MCNC: NEGATIVE MG/DL
PROT UR STRIP-MCNC: NEGATIVE MG/DL

## 2021-07-23 PROCEDURE — 0502F SUBSEQUENT PRENATAL CARE: CPT | Performed by: OBSTETRICS & GYNECOLOGY

## 2021-07-23 RX ORDER — FERROUS SULFATE 325(65) MG
325 TABLET ORAL
COMMUNITY

## 2021-07-23 RX ORDER — ASPIRIN 81 MG/1
81 TABLET ORAL DAILY
COMMUNITY
End: 2021-10-12 | Stop reason: HOSPADM

## 2021-07-23 NOTE — PROGRESS NOTES
Pt presents for routine visit. She denies regular ctx, regular leakage of fluid or vaginal bleeding. She notes active fetal movement. She complains of rash between breasts for several months    O: skin: 2 approx 5 mm pigmented lesions noted mid chest    A/p: AMA: pt is on baby asa, had low risk aneuploidy screen and declined targeted u/s. Planned growth u/s at f/u visit however pt declines. She is agreeable to u/s at 32 weeks. Will request.  Recommended daily fmc's and  testing 35 + weeks.    Rh neg: pt has not obtained documentation of spouse's blood type. Recommend she proceed with rhogam at 28 weeks due to lack of formal documentation of spouse's blood type. She reports she will try to obtain is blood work and will consider otherwise.     28 weeks: reviewed PTL warnings and advised daily fmc's; repeat one hour at f/u visit 2 weeks. Pt desires tdap at f/u and discussed    Skin lesion: recommend she schedule consult with her dermatologist for further evaluation    Pt brought birth plan for review. Discussed her requests and counseling provided. Pt plans to consider further.

## 2021-08-03 ENCOUNTER — ROUTINE PRENATAL (OUTPATIENT)
Dept: OBSTETRICS AND GYNECOLOGY | Age: 36
End: 2021-08-03

## 2021-08-03 VITALS — SYSTOLIC BLOOD PRESSURE: 110 MMHG | WEIGHT: 183.6 LBS | BODY MASS INDEX: 31.76 KG/M2 | DIASTOLIC BLOOD PRESSURE: 60 MMHG

## 2021-08-03 DIAGNOSIS — D57.3 SICKLE CELL TRAIT (HCC): ICD-10-CM

## 2021-08-03 DIAGNOSIS — Z13.1 SCREENING FOR DIABETES MELLITUS: ICD-10-CM

## 2021-08-03 DIAGNOSIS — Z3A.28 28 WEEKS GESTATION OF PREGNANCY: Primary | ICD-10-CM

## 2021-08-03 DIAGNOSIS — Z13.0 SCREENING FOR IRON DEFICIENCY ANEMIA: ICD-10-CM

## 2021-08-03 DIAGNOSIS — Z67.91 BLOOD TYPE, RH NEGATIVE: ICD-10-CM

## 2021-08-03 DIAGNOSIS — Z23 NEED FOR TDAP VACCINATION: ICD-10-CM

## 2021-08-03 LAB
CLARITY, POC: CLEAR
COLOR UR: YELLOW
GLUCOSE UR STRIP-MCNC: NEGATIVE MG/DL
PROT UR STRIP-MCNC: ABNORMAL MG/DL

## 2021-08-03 PROCEDURE — 90471 IMMUNIZATION ADMIN: CPT | Performed by: OBSTETRICS & GYNECOLOGY

## 2021-08-03 PROCEDURE — 0502F SUBSEQUENT PRENATAL CARE: CPT | Performed by: OBSTETRICS & GYNECOLOGY

## 2021-08-03 PROCEDURE — 90715 TDAP VACCINE 7 YRS/> IM: CPT | Performed by: OBSTETRICS & GYNECOLOGY

## 2021-08-03 NOTE — PROGRESS NOTES
Pt presents for routine visit. She notes active fetal movement. She denies ctx, vag bleeding or leaking fluid.     A/p: AMA: pt is taking baby asa and reviewed preeclamptic warnings. Advised daily fmc's. Pt declined growth u/s today. She has scheduled at 32 weeks. Recommend weekly BPP 35+ weeks.     Rh neg: pt declines rhogam and her spouse is O negative per records sent by pt    28 weeks: one hour today with CBC. Advised daily fmc's and discussed PTL warnings. tdap recommended and pt desires today     Hx HSV: plan valtrex suppression at term    Discussed recent ACOG recommendation for covid vaccination in pregnancy

## 2021-08-04 LAB
BASOPHILS # BLD AUTO: 0.04 10*3/MM3 (ref 0–0.2)
BASOPHILS NFR BLD AUTO: 0.4 % (ref 0–1.5)
BLD GP AB SCN SERPL QL: NEGATIVE
EOSINOPHIL # BLD AUTO: 0.09 10*3/MM3 (ref 0–0.4)
EOSINOPHIL NFR BLD AUTO: 0.9 % (ref 0.3–6.2)
ERYTHROCYTE [DISTWIDTH] IN BLOOD BY AUTOMATED COUNT: 13.9 % (ref 12.3–15.4)
GLUCOSE 1H P 50 G GLC PO SERPL-MCNC: 103 MG/DL (ref 65–139)
HCT VFR BLD AUTO: 34.3 % (ref 34–46.6)
HGB BLD-MCNC: 11.3 G/DL (ref 12–15.9)
IMM GRANULOCYTES # BLD AUTO: 0.1 10*3/MM3 (ref 0–0.05)
IMM GRANULOCYTES NFR BLD AUTO: 1 % (ref 0–0.5)
LYMPHOCYTES # BLD AUTO: 1.93 10*3/MM3 (ref 0.7–3.1)
LYMPHOCYTES NFR BLD AUTO: 19 % (ref 19.6–45.3)
MCH RBC QN AUTO: 26.1 PG (ref 26.6–33)
MCHC RBC AUTO-ENTMCNC: 32.9 G/DL (ref 31.5–35.7)
MCV RBC AUTO: 79.2 FL (ref 79–97)
MONOCYTES # BLD AUTO: 0.66 10*3/MM3 (ref 0.1–0.9)
MONOCYTES NFR BLD AUTO: 6.5 % (ref 5–12)
NEUTROPHILS # BLD AUTO: 7.34 10*3/MM3 (ref 1.7–7)
NEUTROPHILS NFR BLD AUTO: 72.2 % (ref 42.7–76)
NRBC BLD AUTO-RTO: 0.1 /100 WBC (ref 0–0.2)
PLATELET # BLD AUTO: 270 10*3/MM3 (ref 140–450)
RBC # BLD AUTO: 4.33 10*6/MM3 (ref 3.77–5.28)
WBC # BLD AUTO: 10.16 10*3/MM3 (ref 3.4–10.8)

## 2021-08-05 LAB
BACTERIA UR CULT: NORMAL
BACTERIA UR CULT: NORMAL

## 2021-08-20 ENCOUNTER — ROUTINE PRENATAL (OUTPATIENT)
Dept: OBSTETRICS AND GYNECOLOGY | Age: 36
End: 2021-08-20

## 2021-08-20 VITALS — SYSTOLIC BLOOD PRESSURE: 100 MMHG | DIASTOLIC BLOOD PRESSURE: 60 MMHG | BODY MASS INDEX: 31.28 KG/M2 | WEIGHT: 180.8 LBS

## 2021-08-20 DIAGNOSIS — Z3A.30 30 WEEKS GESTATION OF PREGNANCY: Primary | ICD-10-CM

## 2021-08-20 PROCEDURE — 0502F SUBSEQUENT PRENATAL CARE: CPT | Performed by: OBSTETRICS & GYNECOLOGY

## 2021-08-20 NOTE — PROGRESS NOTES
She presents for vaginal cramping today. She denies bleeding/leaking. She denies regular ctx. She notes active fetal movement.     A/p: cramping: plan cervical length u/s today; reviewed PTL warnings    Advised covid vaccine now recommended by ACOG, pt declines.    AMA: pt on baby asa, plan growth u/s at f/u, advised daily fmc's    Rh neg: spouse Rh neg and pt declined rhogam    Mild anemia: pt taking iron supplement    Addendum: cervical length u/s is 4 to 4.5 cm. MILAN of 9.46 is at lower end of normal range, pt denies fluid leakage. Plan repeat milan with growth u/s scheduled in 2 weeks.

## 2021-09-03 ENCOUNTER — ROUTINE PRENATAL (OUTPATIENT)
Dept: OBSTETRICS AND GYNECOLOGY | Age: 36
End: 2021-09-03

## 2021-09-03 VITALS — BODY MASS INDEX: 31.17 KG/M2 | DIASTOLIC BLOOD PRESSURE: 56 MMHG | WEIGHT: 180.2 LBS | SYSTOLIC BLOOD PRESSURE: 98 MMHG

## 2021-09-03 DIAGNOSIS — Z3A.32 32 WEEKS GESTATION OF PREGNANCY: Primary | ICD-10-CM

## 2021-09-03 PROCEDURE — 0502F SUBSEQUENT PRENATAL CARE: CPT | Performed by: OBSTETRICS & GYNECOLOGY

## 2021-09-03 NOTE — PROGRESS NOTES
Pt presents for routine visit. She denies regular ctx, vag bleeding or leaking fluid. She notes active fetal movement.     O: u/s: growth = 4 lb 3 oz ( 37 %) with AC at 22 %. MILAN is 9.3    A/p: AMA: growth adequate but milan remains low range of normal. Recommend pt begin weekly BPP. She notes her next visit is in 1 to 2 weeks and agrees to imaging at that appt. Advised daily fmc's and call with any decrease in movement.     Rh neg: pt declined rhogam as spouse is rh neg    Mild anemia: pt taking iron supplement

## 2021-09-15 ENCOUNTER — TRANSCRIBE ORDERS (OUTPATIENT)
Dept: ULTRASOUND IMAGING | Facility: HOSPITAL | Age: 36
End: 2021-09-15

## 2021-09-15 ENCOUNTER — ROUTINE PRENATAL (OUTPATIENT)
Dept: OBSTETRICS AND GYNECOLOGY | Age: 36
End: 2021-09-15

## 2021-09-15 VITALS — BODY MASS INDEX: 32 KG/M2 | SYSTOLIC BLOOD PRESSURE: 108 MMHG | WEIGHT: 185 LBS | DIASTOLIC BLOOD PRESSURE: 64 MMHG

## 2021-09-15 DIAGNOSIS — O09.529 ANTEPARTUM MULTIGRAVIDA OF ADVANCED MATERNAL AGE: Primary | ICD-10-CM

## 2021-09-15 DIAGNOSIS — Z3A.34 34 WEEKS GESTATION OF PREGNANCY: Primary | ICD-10-CM

## 2021-09-15 DIAGNOSIS — O41.03X0 OLIGOHYDRAMNIOS IN THIRD TRIMESTER, SINGLE OR UNSPECIFIED FETUS: ICD-10-CM

## 2021-09-15 DIAGNOSIS — O28.8 AFI (AMNIOTIC FLUID INDEX) DECREASED: ICD-10-CM

## 2021-09-15 PROCEDURE — 0502F SUBSEQUENT PRENATAL CARE: CPT | Performed by: OBSTETRICS & GYNECOLOGY

## 2021-09-15 RX ORDER — VALACYCLOVIR HYDROCHLORIDE 500 MG/1
500 TABLET, FILM COATED ORAL 2 TIMES DAILY
Qty: 60 TABLET | Refills: 1 | Status: SHIPPED | OUTPATIENT
Start: 2021-09-15 | End: 2021-10-12 | Stop reason: HOSPADM

## 2021-09-15 NOTE — PROGRESS NOTES
Pt presents for routine visit and u/s. She notes some increased discharge but denies leaking fluid, vag bleeding or regular ctx. She notes active fetal movement.   O: BPP 8/8, linus 4.1 - 9.1  SDP = 5  Spec exam: vagina dry with scant white discharge, no leaking of fluid with cough; negative nitrazine and fern    A/p: oligohydramnios: varying linus values from 4 to 9.; consult placed to [facility] and discussed findings verbally with Dr. Valdes. MFM will see tomorrow at 10:30.  Plan NST today. If reassuring, pt agrees to f/u with MFM in AM, hydrate this pm and continue fmc's.     HSV: pt denies symptoms or outbreak, recommend she start suppression with valtrex and rx sent with instruction.     AMA: continue daily fmc's and weekly BPP; may need increased surveillance pending results of consult    Addendum: NST obtained monitored for 34 min  Indication: oligohydramnios  Interpretation: Reactive, baseline 130 bpm, no significant decelerations or regular ctx noted  Plan: consult with [facility] tomorrow

## 2021-09-16 ENCOUNTER — TRANSCRIBE ORDERS (OUTPATIENT)
Dept: ULTRASOUND IMAGING | Facility: HOSPITAL | Age: 36
End: 2021-09-16

## 2021-09-16 ENCOUNTER — TELEPHONE (OUTPATIENT)
Dept: OBSTETRICS AND GYNECOLOGY | Age: 36
End: 2021-09-16

## 2021-09-16 ENCOUNTER — OFFICE VISIT (OUTPATIENT)
Dept: OBSTETRICS AND GYNECOLOGY | Facility: CLINIC | Age: 36
End: 2021-09-16

## 2021-09-16 ENCOUNTER — HOSPITAL ENCOUNTER (OUTPATIENT)
Dept: ULTRASOUND IMAGING | Facility: HOSPITAL | Age: 36
Discharge: HOME | End: 2021-09-16
Admitting: OBSTETRICS & GYNECOLOGY

## 2021-09-16 VITALS
WEIGHT: 186 LBS | DIASTOLIC BLOOD PRESSURE: 63 MMHG | HEIGHT: 64 IN | TEMPERATURE: 98.1 F | BODY MASS INDEX: 31.76 KG/M2 | SYSTOLIC BLOOD PRESSURE: 119 MMHG | HEART RATE: 81 BPM

## 2021-09-16 DIAGNOSIS — O09.529 ANTEPARTUM MULTIGRAVIDA OF ADVANCED MATERNAL AGE: ICD-10-CM

## 2021-09-16 DIAGNOSIS — D57.3 SICKLE CELL TRAIT (HCC): ICD-10-CM

## 2021-09-16 DIAGNOSIS — O35.BXX0 COARCTATION OF AORTA, FETAL, AFFECTING CARE OF MOTHER: ICD-10-CM

## 2021-09-16 DIAGNOSIS — O41.03X0 OLIGOHYDRAMNIOS IN THIRD TRIMESTER, SINGLE OR UNSPECIFIED FETUS: ICD-10-CM

## 2021-09-16 DIAGNOSIS — A60.04 HERPES SIMPLEX VULVOVAGINITIS: ICD-10-CM

## 2021-09-16 DIAGNOSIS — O09.523 MULTIGRAVIDA OF ADVANCED MATERNAL AGE IN THIRD TRIMESTER: ICD-10-CM

## 2021-09-16 DIAGNOSIS — O28.8 AFI (AMNIOTIC FLUID INDEX) DECREASED: ICD-10-CM

## 2021-09-16 DIAGNOSIS — O09.523 MULTIGRAVIDA OF ADVANCED MATERNAL AGE IN THIRD TRIMESTER: Primary | ICD-10-CM

## 2021-09-16 DIAGNOSIS — O41.03X0 OLIGOHYDRAMNIOS WITHOUT RUPTURE OF MEMBRANES, THIRD TRIMESTER, NOT APPLICABLE OR UNSPECIFIED FETUS: Primary | ICD-10-CM

## 2021-09-16 PROCEDURE — 76819 FETAL BIOPHYS PROFIL W/O NST: CPT

## 2021-09-16 PROCEDURE — 76811 OB US DETAILED SNGL FETUS: CPT | Performed by: OBSTETRICS & GYNECOLOGY

## 2021-09-16 PROCEDURE — 76811 OB US DETAILED SNGL FETUS: CPT

## 2021-09-16 PROCEDURE — 76819 FETAL BIOPHYS PROFIL W/O NST: CPT | Performed by: OBSTETRICS & GYNECOLOGY

## 2021-09-16 PROCEDURE — 99214 OFFICE O/P EST MOD 30 MIN: CPT | Performed by: OBSTETRICS & GYNECOLOGY

## 2021-09-16 PROCEDURE — 76820 UMBILICAL ARTERY ECHO: CPT

## 2021-09-16 PROCEDURE — 76820 UMBILICAL ARTERY ECHO: CPT | Performed by: OBSTETRICS & GYNECOLOGY

## 2021-09-16 NOTE — TELEPHONE ENCOUNTER
Called pt to discussed MFM consult and no answer. Spoke with OMAR manager and discussed that pt will need dopplers at [facility]. She notes they were waiting for patient to contact them back to book appts.     Left message with pt to contact office. Will forward message to MA to advise pt she will need BPP and dopplers with MFM.

## 2021-09-16 NOTE — PROGRESS NOTES
Please confirm pt schedules f/u u/s evaluations with MFM as she now needs doppler with BPP twice weekly.

## 2021-09-16 NOTE — PROGRESS NOTES
Pt reports that she is doing well and denies vaginal bleeding, cramping, contractions, LOF. Reports active fetal movement

## 2021-09-16 NOTE — PROGRESS NOTES
"  Consultation:     Gustabo Jacome is a 36 y.o. female,  Patient's last menstrual period was 2021 (exact date). EDC of 10/25/2021, by Last Menstrual Period now 34w3d weeks seen in consultation as requested by Cassandra Fields MD secondary to:    1) Oligohydramnios - MILAN yesterday measured at 4 cm.  Patient denies SROM.  Patient reports not drinking an adequate amount of water per day (8 x 8 oz glasses).  2) Genital HSV  3) Sickle Cell Trait  4) AMA   5) Possible fetal coarctation of the aorta seen on U/S today    Vitals:    21 1046   BP: 119/63   BP Location: Right arm   Patient Position: Sitting   Pulse: 81   Temp: 98.1 °F (36.7 °C)   Weight: 84.4 kg (186 lb)   Height: 162.6 cm (64\")         Previous Obstetrical History:    1)  - First trimester spontaneous  without D&C    Previous Gyn History:    Patient denies GC/Chlamydia/Syphilis.    2018 - abnormal PAP - LEEP    Catamenia - 11 x 33 x 3  Contraception - none      Lab Results   Component Value Date    ABO A 2021    RPR Non Reactive 2021    HGB 11.3 (L) 2021    HCT 34.3 2021     A negative/Ab - negative  RI  Hep B sAg - negative  Hep C - negative  Hb electrophoresis - 36.3% Hb S  TSH - 1.030 (WNL)  Varicella - positive  MS-AFP declined  50 gram Glucola - 103  NIPT - low risk    Previous Medical History:    Herpes -  - genital HSV    DM - patient denies HTN - patient denies Asthma - patient denies   Thyroid - patient denies    Rheumatic Fever - patient denies  Heart - patient denies   Lung - patient denies   Liver - patient denies  Kidney - patient denies   Fever - patient denies  TB - patient denies UTI - patient denies   Epilepsy - patient denies  Other - neg    Previous Surgical History:    1)  - Oral/mandibular surgery - without complications    Medications:    PN Vitamins, Iron QD, LDA 81 mg    Allergies:    No Known Allergies     Current Outpatient Medications   Medication Sig Dispense Refill   • " aspirin 81 MG EC tablet Take 81 mg by mouth Daily.     • ferrous sulfate 325 (65 FE) MG tablet Take 325 mg by mouth Daily With Breakfast.     • Prenatal Vit-Fe Fum-FA-Omega (PNV Prenatal Plus Multivit+DHA) 27-1 & 312 MG misc TAKE 1 TAB & 1 CAPSULE BY MOUTH ONE TIME A DAY  60 each 12   • valACYclovir (Valtrex) 500 MG tablet Take 1 tablet by mouth 2 (Two) Times a Day for 30 days. 60 tablet 1     No current facility-administered medications for this visit.       Habits:    Smoking - neg  Drinking - neg  Drugs - neg    Psychosocial:    .      Sexual Abuse History: never  Physical Abuse History: never  Verbal Abuse History: never    Family History:    DM - neg HTN - neg Twins - GM Stillborns - neg  Birth Defects - neg Mental Retardation - neg  Blood Dyscrasias - neg    Anesthesia Complications - neg Genetic - neg  Other - neg    Review of Systems    Otherwise negative    LMP 2021 (Exact Date)   Remainder of exam deferred.    U/S:  Mild oligohydramnios (MILAN 7.1).  Some images of the aorta may be indicative of a small coarctation.  No other fetal abnormalities were identified.  Fetal breathing noted. Umbilical artery S/D ratio is normal. Fetal growth is appropriate. Fetal testing is reassuring.     OLIGOHYDRAMNIOS NEAR TERM:    General counseling was performed regarding oligohydramnios near term.  The increased risk of  intrauterine fetal demise was discussed when MILAN is less than 5 cm.  Severe oligohydramnios at near term should be treated with delivery due to increased  morbidity and mortality.    HSV:    General counseling regarding genital HSV and the risk of fetal infection at the time of delivery was performed.  The greatest risk for fetal infection occurs with primary genital HSV and vaginal delivery.  This can be greatly reduced by delivery by C/S.  The utility of C/S with recurrent risk HSV is much lower perhaps reducing the risk of fetal infection by only 1-3%.  It is  reasonable to offer C/S in this setting if active HSV is present.  The role of prophylaxis beginning @ 36 weeks was discussed.    Initial outbreaks may be treated with Valacyclovir (Valtrex) 1000 mg BID x 10 days; recurrent outbreaks may be treated with Valacyclovir (Valtrex) 500 mg BID x 3 days; prophylaxis with Valacyclovir (Valtrex) for pregnant women may be given with 500 mg BID (in immunocompromised or HIV infected patients 1000 mg BID).    Due to the high incidence of HSV 1 causing genital lesions, HSV prophylaxis should be considered at 36 weeks as well if a patient has a positive serology and no history of oral lesions.        SICKLE CELL TRAIT:    General counseling was then performed regarding Sickle Cell trait, its associated mild anemia and the risk for UTI/Pyelonephritis.  The autosomal recessive (co-dominant) nature of this condition was explained.  Given a partner of  extraction and unknown Sickle status, a risk of Sickle Cell Disease in her fetus can be calculated based on gene frequency (1/10) to be 1/40 or 2.5%.  U C&S monthly during pregnancy is recommended.       ADVANCED MATERNAL AGE:    General counseling regarding advanced maternal age was then performed.  Included in this counseling was increased risk for Trisomy 21, pre-eclampsia, IUGR, gestational diabetes and   increased  morbidity and mortality.  The roles of early 1 hr Gluola screening with repeat @ 24-28 weeks, serial U/S every 4 weeks for fetal growth and close A-P surveillance beginning at 32 weeks were discussed.  For patients aged 40 and above, delivery @ 39 weeks is recommended.     Priyanka et al: Increased maternal Age and Risk of Fetal death.  NEJM 1995; 333:953-957    Felton VILLAFUERTE et al:  The impact of maternal age on fetal death: does length of gestation matter?  Am J Obstet Gynecol. 2010 Dec;203 (6):554.e1-8.      CONGENITAL CARDIAC MALFORMATION:    General counseling regarding congenital cardiac  malformations was performed.  About 5 % of congenital cardiac malformations are associated with chromosomal anomalies.The multifactorial/polygenic nature of the majority of congenital cardiac malformations was explained including the concepts of genes at risk, environmental influences (ex. viral infection and DM) and recurrence risk which is based on the number of first degree relatives affected as well as the severity of the disease.  With one affected first degree relative the recurrence risk is 2-5%.        IMPRESSIONS:    1)     Mild oligohydramnios  2)     Herpes genitalis  3)     Sickle Cell Trait  4)     AMA  5)     Possible fetal coarctation of the aorta      RECOMMENDATIONS:    1) Monthly U C&S  2) Serial U/S every 4 weeks for fetal growth  3) Twice weekly BPP and UA dopplers  4) Fetal ECHO in 1 week  5)  Increase hydration (8 x 8 oz glasses water dailyClose A-P surveillance  6)  Initiate HSV prophylaxis now (to be done by OB)            Total time spent TODAY on this encounter, including pre-visit review of separately obtained history, face-to-face interaction performing medically appropriate physical exam, patient counseling/education, interpretation of diagnostic results, care coordination and documentation was 60 minutes.      Thank you for utilizing our ultrasound and consultative services.  If I may be of any further service, please do not hesitate to contact me.    Sincerely,    Osito Valdes MD  Maternal-Fetal Medicine

## 2021-09-21 NOTE — PLAN OF CARE
Goal Outcome Evaluation:      Spoke with patient yesterday regarding the plan of care.  I stressed the need for weekly BPP and alerted staff to allow her to make a 3 PM appointment due to her long work days where she starts around 6 AM and does not get a break of time for lunch during her work day.  She stated that she could leave @ 14:45 PM and be here by 15:00 PM.

## 2021-09-22 ENCOUNTER — TRANSCRIBE ORDERS (OUTPATIENT)
Dept: ULTRASOUND IMAGING | Facility: HOSPITAL | Age: 36
End: 2021-09-22

## 2021-09-22 ENCOUNTER — TELEPHONE (OUTPATIENT)
Dept: OBSTETRICS AND GYNECOLOGY | Facility: CLINIC | Age: 36
End: 2021-09-22

## 2021-09-22 ENCOUNTER — TELEPHONE (OUTPATIENT)
Dept: OBSTETRICS AND GYNECOLOGY | Age: 36
End: 2021-09-22

## 2021-09-22 DIAGNOSIS — O09.523 MULTIGRAVIDA OF ADVANCED MATERNAL AGE IN THIRD TRIMESTER: Primary | ICD-10-CM

## 2021-09-22 DIAGNOSIS — O41.03X0 OLIGOHYDRAMNIOS WITHOUT RUPTURE OF MEMBRANES, THIRD TRIMESTER, NOT APPLICABLE OR UNSPECIFIED FETUS: ICD-10-CM

## 2021-09-22 NOTE — TELEPHONE ENCOUNTER
Patient called the office today asking to reschedule her fetal echo for tomorrow morning to a later time tomorrow. Her original appt was at a later time in the morning, that time is no longer available. She changed her appt to an 8:30 am slot on Monday.     I advised the patient that she is also supposed to get BPP and Dopplers in office tomorrow and she said she is aware of the need for those scans and the reasons for the scans as well.     I asked the physician to speak to her, he was with another patient at that time, he advised me to make sure to let the patient know she is supposed to get other testing tomorrow. I advised the patient.     I will ask Dr Valdes to call the patient when he is able to during his clinic today to discuss when her next appt will be. No fetal echo openings next week at this time, that information was also provided to the patient.     I discussed with the patient that we need to schedule her other follow up appts and that we are working very hard to accommodate her scheduling needs at this time. Dr Valdes discussed her schedule with her on Monday and set up a plan of care with weekly BPP & Dopplers at 3:00 pm. Outside of our normal scheduling time in order to assist the patient with her scheduling needs.       Patient stated she will wait until the physician calls her to discuss her future appts.

## 2021-09-22 NOTE — TELEPHONE ENCOUNTER
Called patient to discuss MFM consult and indicated fetal evaluations. Dr. Valdes had initially advised twice weekly BPP/doppler but he has since advised that weekly BPP/doppler was acceptable as patient noted difficulty making u/s appts due to her schedule. Patient has a scheduled appt tomorrow for fetal echo and the MFM was going to do BPP and doppler as well. Patient notes she can no longer make the appt tomorrow due to her work schedule. Stressed to patient that the fetal findings are very concerning and she needs to attend the appointment. She has been offered a note for work to excuse the missed time. Patient reports she is very focused on work at this time and will not miss the event at work tomorrow. Advised patient that failure to comply with recommended follow-up could result in fetal harm or loss of infant. She reports she fully understands this but will not be coming to her scheduled appt for tomorrow.

## 2021-09-23 ENCOUNTER — OFFICE VISIT (OUTPATIENT)
Dept: OBSTETRICS AND GYNECOLOGY | Facility: CLINIC | Age: 36
End: 2021-09-23

## 2021-09-23 ENCOUNTER — TRANSCRIBE ORDERS (OUTPATIENT)
Dept: ULTRASOUND IMAGING | Facility: HOSPITAL | Age: 36
End: 2021-09-23

## 2021-09-23 ENCOUNTER — HOSPITAL ENCOUNTER (OUTPATIENT)
Facility: HOSPITAL | Age: 36
Discharge: HOME | End: 2021-09-23
Attending: OBSTETRICS & GYNECOLOGY | Admitting: OBSTETRICS & GYNECOLOGY

## 2021-09-23 ENCOUNTER — HOSPITAL ENCOUNTER (OUTPATIENT)
Dept: ULTRASOUND IMAGING | Facility: HOSPITAL | Age: 36
Discharge: HOME | End: 2021-09-23
Admitting: OBSTETRICS & GYNECOLOGY

## 2021-09-23 ENCOUNTER — APPOINTMENT (OUTPATIENT)
Dept: ULTRASOUND IMAGING | Facility: HOSPITAL | Age: 36
End: 2021-09-23

## 2021-09-23 VITALS
TEMPERATURE: 97.4 F | OXYGEN SATURATION: 100 % | HEART RATE: 91 BPM | RESPIRATION RATE: 18 BRPM | DIASTOLIC BLOOD PRESSURE: 71 MMHG | SYSTOLIC BLOOD PRESSURE: 126 MMHG | WEIGHT: 184 LBS | HEIGHT: 64 IN | BODY MASS INDEX: 31.41 KG/M2

## 2021-09-23 VITALS
DIASTOLIC BLOOD PRESSURE: 81 MMHG | WEIGHT: 184.4 LBS | BODY MASS INDEX: 31.48 KG/M2 | HEIGHT: 64 IN | SYSTOLIC BLOOD PRESSURE: 113 MMHG | TEMPERATURE: 98.1 F | HEART RATE: 86 BPM

## 2021-09-23 DIAGNOSIS — O09.523 MULTIGRAVIDA OF ADVANCED MATERNAL AGE IN THIRD TRIMESTER: ICD-10-CM

## 2021-09-23 DIAGNOSIS — O41.03X0 OLIGOHYDRAMNIOS WITHOUT RUPTURE OF MEMBRANES, THIRD TRIMESTER, NOT APPLICABLE OR UNSPECIFIED FETUS: ICD-10-CM

## 2021-09-23 DIAGNOSIS — O09.523 MULTIGRAVIDA OF ADVANCED MATERNAL AGE IN THIRD TRIMESTER: Primary | ICD-10-CM

## 2021-09-23 DIAGNOSIS — O41.03X0 OLIGOHYDRAMNIOS WITHOUT RUPTURE OF MEMBRANES, THIRD TRIMESTER, NOT APPLICABLE OR UNSPECIFIED FETUS: Primary | ICD-10-CM

## 2021-09-23 DIAGNOSIS — O28.8 AFI (AMNIOTIC FLUID INDEX) DECREASED: ICD-10-CM

## 2021-09-23 DIAGNOSIS — O35.BXX0 COARCTATION OF AORTA, FETAL, AFFECTING CARE OF MOTHER: ICD-10-CM

## 2021-09-23 DIAGNOSIS — O09.529 ANTEPARTUM MULTIGRAVIDA OF ADVANCED MATERNAL AGE: Primary | ICD-10-CM

## 2021-09-23 PROBLEM — Z34.90 PREGNANCY: Status: ACTIVE | Noted: 2021-09-23

## 2021-09-23 PROCEDURE — G0463 HOSPITAL OUTPT CLINIC VISIT: HCPCS

## 2021-09-23 PROCEDURE — 59025 FETAL NON-STRESS TEST: CPT

## 2021-09-23 PROCEDURE — 76819 FETAL BIOPHYS PROFIL W/O NST: CPT

## 2021-09-23 PROCEDURE — 59025 FETAL NON-STRESS TEST: CPT | Performed by: OBSTETRICS & GYNECOLOGY

## 2021-09-23 PROCEDURE — 76819 FETAL BIOPHYS PROFIL W/O NST: CPT | Performed by: OBSTETRICS & GYNECOLOGY

## 2021-09-23 NOTE — NON STRESS TEST
Gustabo Jacome, a  at 35w3d with an CAROLA of 10/25/2021, by Last Menstrual Period, was seen at [facility] LABOR DELIVERY for a nonstress test.    Chief Complaint   Patient presents with   • Non-stress Test     PT WAS SENT OVER FROM THE OFFICE FOR A NST        Patient Active Problem List   Diagnosis   • Blood type, Rh negative   • History of cervical dysplasia   • History of loop electrosurgical excision procedure (LEEP) of cervix affecting pregnancy, antepartum   • Herpes simplex vulvovaginitis   • AMA (advanced maternal age) multigravida 35+   • Sickle cell trait (CMS/HCC)   • Oligohydramnios without rupture of membranes, third trimester, not applicable or unspecified fetus   • Coarctation of aorta, fetal, affecting care of mother   • Pregnancy       Start Time:   Stop Time:     Interpretation A  Nonstress Test Interpretation A: Reactive (21 : Delia Tang, RN)  Comments A: [id number] (21 : Delia Tang, RN)

## 2021-09-23 NOTE — PROGRESS NOTES
Pt reports that she is doing well and denies vaginal bleeding, cramping, contractions, LOF. Reports active fetal movement.

## 2021-09-23 NOTE — NON STRESS TEST
Gustabo Jacome, a  at 35w3d with an CAROLA of 10/25/2021, by Last Menstrual Period, was seen at [facility]OR DELIVERY for a nonstress test.    Chief Complaint   Patient presents with   • Non-stress Test     PT WAS SENT OVER FROM THE OFFICE FOR A NST        Patient Active Problem List   Diagnosis   • Blood type, Rh negative   • History of cervical dysplasia   • History of loop electrosurgical excision procedure (LEEP) of cervix affecting pregnancy, antepartum   • Herpes simplex vulvovaginitis   • AMA (advanced maternal age) multigravida 35+   • Sickle cell trait (CMS/HCC)   • Oligohydramnios without rupture of membranes, third trimester, not applicable or unspecified fetus   • Coarctation of aorta, fetal, affecting care of mother   • Pregnancy       Start Time:1411  Stop Time:1355

## 2021-09-23 NOTE — PROGRESS NOTES
Gustabo Jacome is a 36 y.o. female,  Patient's last menstrual period was 2021 (exact date). EDC of 10/25/2021, by Last Menstrual Period now 35w3d weeks seen for F/U as requested by Cassandra Fields MD secondary to:    1) Oligohydramnios    Vitals:    21 1208   BP: 113/81   Pulse: 86   Temp: 98.1 °F (36.7 °C)       Patient failed to keep appointment on Monday, 2021 for BPP.   She cancelled the fetal ECHO that was scheduled. After several calls this week, patient said she would come in today ! 11:00 for BPP.  She arrived at 12:00.    Ultrasound images and findings reviewed.5% < MILAN< 50%.  Equivocal BPP -  (2 off for movement).  Normal UA S/D dopplers.  Dr. Fields contacted.  Patient was sent to L&D for NST.    Patient was not seen by MFM.      ASSESSMENT:      ICD-10-CM ICD-9-CM   1. Oligohydramnios without rupture of membranes, third trimester, not applicable or unspecified fetus  O41.03X0 658.03   2. Coarctation of aorta, fetal, affecting care of mother  O35.8XX0 655.80        Recommendations:    1) Weekly BPP  2) Reschedule fetal ECHO  3) Serial U/S every 4 weeks for fetal growth    Total time spent TODAY on this encounter, including pre-visit review of separately obtained history, interpretation of diagnostic results, care coordination and documentation was 10 minutes.NO CHARGE as patient was not seen by MFM.      Thank you for utilizing our ultrasound and consultative services.  If I may be of any further service, please do not hesitate to contact me.    Sincerely,    Osito Valdes MD  Maternal-Fetal Medicine

## 2021-09-30 ENCOUNTER — OFFICE VISIT (OUTPATIENT)
Dept: OBSTETRICS AND GYNECOLOGY | Facility: CLINIC | Age: 36
End: 2021-09-30

## 2021-09-30 ENCOUNTER — HOSPITAL ENCOUNTER (OUTPATIENT)
Dept: ULTRASOUND IMAGING | Facility: HOSPITAL | Age: 36
Discharge: HOME | End: 2021-09-30
Admitting: OBSTETRICS & GYNECOLOGY

## 2021-09-30 ENCOUNTER — TRANSCRIBE ORDERS (OUTPATIENT)
Dept: ULTRASOUND IMAGING | Facility: HOSPITAL | Age: 36
End: 2021-09-30

## 2021-09-30 VITALS
HEIGHT: 64 IN | HEART RATE: 81 BPM | SYSTOLIC BLOOD PRESSURE: 112 MMHG | BODY MASS INDEX: 32.13 KG/M2 | DIASTOLIC BLOOD PRESSURE: 70 MMHG | TEMPERATURE: 97.8 F | WEIGHT: 188.2 LBS

## 2021-09-30 DIAGNOSIS — O09.523 MULTIGRAVIDA OF ADVANCED MATERNAL AGE IN THIRD TRIMESTER: ICD-10-CM

## 2021-09-30 DIAGNOSIS — O41.03X0 OLIGOHYDRAMNIOS WITHOUT RUPTURE OF MEMBRANES, THIRD TRIMESTER, NOT APPLICABLE OR UNSPECIFIED FETUS: ICD-10-CM

## 2021-09-30 DIAGNOSIS — O41.03X0 OLIGOHYDRAMNIOS WITHOUT RUPTURE OF MEMBRANES, THIRD TRIMESTER, NOT APPLICABLE OR UNSPECIFIED FETUS: Primary | ICD-10-CM

## 2021-09-30 DIAGNOSIS — O09.523 MULTIGRAVIDA OF ADVANCED MATERNAL AGE IN THIRD TRIMESTER: Primary | ICD-10-CM

## 2021-09-30 DIAGNOSIS — O28.8 AFI (AMNIOTIC FLUID INDEX) DECREASED: ICD-10-CM

## 2021-09-30 PROCEDURE — 76819 FETAL BIOPHYS PROFIL W/O NST: CPT | Performed by: OBSTETRICS & GYNECOLOGY

## 2021-09-30 PROCEDURE — 76819 FETAL BIOPHYS PROFIL W/O NST: CPT

## 2021-10-01 ENCOUNTER — ROUTINE PRENATAL (OUTPATIENT)
Dept: OBSTETRICS AND GYNECOLOGY | Age: 36
End: 2021-10-01

## 2021-10-01 ENCOUNTER — DOCUMENTATION (OUTPATIENT)
Dept: OBSTETRICS AND GYNECOLOGY | Age: 36
End: 2021-10-01

## 2021-10-01 VITALS — BODY MASS INDEX: 32.03 KG/M2 | SYSTOLIC BLOOD PRESSURE: 100 MMHG | DIASTOLIC BLOOD PRESSURE: 60 MMHG | WEIGHT: 186.6 LBS

## 2021-10-01 DIAGNOSIS — Z36.85 ANTENATAL SCREENING FOR STREPTOCOCCUS B: ICD-10-CM

## 2021-10-01 DIAGNOSIS — Z3A.36 36 WEEKS GESTATION OF PREGNANCY: Primary | ICD-10-CM

## 2021-10-01 PROCEDURE — 0502F SUBSEQUENT PRENATAL CARE: CPT | Performed by: OBSTETRICS & GYNECOLOGY

## 2021-10-01 NOTE — PROGRESS NOTES
Pt presents for routine visit. She notes active fetal movement and denies regular ctx, vag bleeding or leaking fluid. She had BPP with MF yesterday and was told testing was normal. She reports she has fetal echo booked with [facility] appt next week. She denies any HSV symptoms or outbreaks.    O: GBS obtained and cervix checked per pt request    A/p: 36 weeks: GBS done and counseling provided. Reviewed labor warnings and advised daily fmc's. Pt declines flu shot.     Abnormal fetal u/s suggesting coarctation and history of oligohydramnios: recommend compliance attending fetal echo appt and MFM follow-up. Discussed potential need for immediate  management of cardiac abnormality. Advised pt alert  providers of u/s abnormality if she delivers prior to having echo. Discussed that her baby may require transfer for further management. She notes understanding. Recommended compliance with future appts for fetal evaluation. Advised daily fmc's.     HSV: pt reports she is taking valtrex suppression and denies any symptoms or outbreaks. She is advised that  delivery is needed should symptoms or outbreak be noted at time of labor.     Patient notes that she is unsure if she is comfortable with this provider providing care during delivery. She reports this is related to phone call after she missed appointment with MFM. Discussed with patient that the phone call was regarding her missed appointment with [facility]. The manager with [facility] had unsuccessfully tried to schedule patient for indicated follow-up ultrasounds and had informed me of this issue. Patient notes understanding but feels that she can no longer trust this provider to manage her delivery. Noted understanding and that patient has option to transfer to another OB office. Informed acting manager of patient's feelings.     MA present during visit and exam.

## 2021-10-01 NOTE — PROGRESS NOTES
Addendum: final result of u/s from [facility] on 9/30/21 available after visit. BPP was 8/8 and MILAN was 12; [facility] advised f/u u/s in 1 week

## 2021-10-03 LAB — GP B STREP DNA SPEC QL NAA+PROBE: NEGATIVE

## 2021-10-06 ENCOUNTER — HOSPITAL ENCOUNTER (OUTPATIENT)
Dept: ULTRASOUND IMAGING | Facility: HOSPITAL | Age: 36
Discharge: HOME | End: 2021-10-06
Admitting: OBSTETRICS & GYNECOLOGY

## 2021-10-06 ENCOUNTER — OFFICE VISIT (OUTPATIENT)
Dept: OBSTETRICS AND GYNECOLOGY | Facility: CLINIC | Age: 36
End: 2021-10-06

## 2021-10-06 VITALS
SYSTOLIC BLOOD PRESSURE: 114 MMHG | HEIGHT: 64 IN | BODY MASS INDEX: 32.1 KG/M2 | HEART RATE: 71 BPM | WEIGHT: 188 LBS | DIASTOLIC BLOOD PRESSURE: 74 MMHG | TEMPERATURE: 97.8 F

## 2021-10-06 DIAGNOSIS — O41.03X0 OLIGOHYDRAMNIOS WITHOUT RUPTURE OF MEMBRANES, THIRD TRIMESTER, NOT APPLICABLE OR UNSPECIFIED FETUS: Primary | ICD-10-CM

## 2021-10-06 DIAGNOSIS — O35.BXX0 COARCTATION OF AORTA, FETAL, AFFECTING CARE OF MOTHER: ICD-10-CM

## 2021-10-06 DIAGNOSIS — O09.523 MULTIGRAVIDA OF ADVANCED MATERNAL AGE IN THIRD TRIMESTER: ICD-10-CM

## 2021-10-06 DIAGNOSIS — O36.5930 MATERNAL CARE FOR POOR FETAL GROWTH IN THIRD TRIMESTER, SINGLE OR UNSPECIFIED FETUS: ICD-10-CM

## 2021-10-06 DIAGNOSIS — O41.03X0 OLIGOHYDRAMNIOS WITHOUT RUPTURE OF MEMBRANES, THIRD TRIMESTER, NOT APPLICABLE OR UNSPECIFIED FETUS: ICD-10-CM

## 2021-10-06 PROCEDURE — 76819 FETAL BIOPHYS PROFIL W/O NST: CPT | Performed by: OBSTETRICS & GYNECOLOGY

## 2021-10-06 PROCEDURE — 76820 UMBILICAL ARTERY ECHO: CPT | Performed by: OBSTETRICS & GYNECOLOGY

## 2021-10-06 PROCEDURE — 76820 UMBILICAL ARTERY ECHO: CPT

## 2021-10-06 PROCEDURE — 76816 OB US FOLLOW-UP PER FETUS: CPT | Performed by: OBSTETRICS & GYNECOLOGY

## 2021-10-06 PROCEDURE — 76816 OB US FOLLOW-UP PER FETUS: CPT

## 2021-10-06 PROCEDURE — 76819 FETAL BIOPHYS PROFIL W/O NST: CPT

## 2021-10-06 PROCEDURE — 99213 OFFICE O/P EST LOW 20 MIN: CPT | Performed by: OBSTETRICS & GYNECOLOGY

## 2021-10-06 NOTE — PROGRESS NOTES
Please note, AMANDA has advised BPP with office appt on 10/8/2021. Please arrange for BPP on 10/8 as advised by MAANDA.

## 2021-10-06 NOTE — PROGRESS NOTES
Gustabo Jacome is a 36 y.o. female,  Patient's last menstrual period was 2021 (exact date). EDC of 10/25/2021, by Last Menstrual Period now 37w2d weeks seen for F/U as requested by Cassandra Fields MD secondary to:    1) AMA  2) Possible fetal coarctation of the aorta  3) IUGR - AC @ 8%    Patient is well and without complaints.  She reports decreased intake of water.      Vitals:    10/06/21 1516   BP: 114/74   Pulse: 71   Temp: 97.8 °F (36.6 °C)       Ultrasound images and findings reviewed with patient.  IUGR is present (AC @ 8%).  3% < MILAN < 5%.  Aorta not visualized on today's U/S.  Fetal testing is reassuring.     General counseling performed regarding poor fetal growth. The association of IUGR with increased  morbidity and mortality, various etiologies i.e. maternal disease, intrauterine infection, fetal chromosomal abnormalities, fetal anomalies, anti-phospholipid antibody syndrome, placental abnormalities and complicating multiple gestations and follow-up were discussed.  The diagnostic criteria i.e.  EFW < 10% or AC < 10% was discussed.  Approximately 75% of fetuses < 10% are not at risk for increased  morbidity and mortality and may be normal or constitutionally small.  The role of serial U/S for growth and close A-P surveillance was discussed.  LDA 81 mg QD is now recommended in order to reduce the risk for pre-eclampsia.    Delivery between 38 0/ 7-39/6/7 weeks is recommended by ACOG for isolated IUGR (EFW < 10% or AC < 10%) with normal UA dopplers, fetal testing and fluid.  It is reasonable to deliver these patients at 39 weeks.  If EFW is less than the 3%, delivery @ 37 weeks is recommended.    ASSESSMENT:  1) AMA  2) Possible fetal coarctation of the aorta  3) IUGR    Questions answered.    Recommendations:    1) BPP by Provider during office visit on 10/08/21  2) Fetal ECHO on 10/12/21  3) Weekly BPP with UA dopplers  4) Delivery between 38-39  weeks    Total time  spent TODAY on this encounter, including pre-visit review of separately obtained history, face-to-face interaction performing medically appropriate physical exam, patient counseling/education, interpretation of diagnostic results, care coordination and documentation was 25 minutes.      Thank you for utilizing our ultrasound and consultative services.  If I may be of any further service, please do not hesitate to contact me.    Sincerely,    Osito Valdes MD  Maternal-Fetal Medicine

## 2021-10-08 ENCOUNTER — ANESTHESIA EVENT (OUTPATIENT)
Dept: LABOR AND DELIVERY | Facility: HOSPITAL | Age: 36
End: 2021-10-08

## 2021-10-08 ENCOUNTER — ROUTINE PRENATAL (OUTPATIENT)
Dept: OBSTETRICS AND GYNECOLOGY | Age: 36
End: 2021-10-08

## 2021-10-08 ENCOUNTER — ANESTHESIA (OUTPATIENT)
Dept: LABOR AND DELIVERY | Facility: HOSPITAL | Age: 36
End: 2021-10-08

## 2021-10-08 ENCOUNTER — HOSPITAL ENCOUNTER (INPATIENT)
Facility: HOSPITAL | Age: 36
LOS: 4 days | Discharge: HOME | End: 2021-10-12
Attending: OBSTETRICS & GYNECOLOGY | Admitting: OBSTETRICS & GYNECOLOGY

## 2021-10-08 VITALS — DIASTOLIC BLOOD PRESSURE: 70 MMHG | BODY MASS INDEX: 32.1 KG/M2 | WEIGHT: 187 LBS | SYSTOLIC BLOOD PRESSURE: 110 MMHG

## 2021-10-08 DIAGNOSIS — O36.5930 POOR FETAL GROWTH AFFECTING MANAGEMENT OF MOTHER IN THIRD TRIMESTER, SINGLE OR UNSPECIFIED FETUS: ICD-10-CM

## 2021-10-08 DIAGNOSIS — O09.523 MULTIGRAVIDA OF ADVANCED MATERNAL AGE IN THIRD TRIMESTER: ICD-10-CM

## 2021-10-08 DIAGNOSIS — Z13.89 SCREENING FOR BLOOD OR PROTEIN IN URINE: Primary | ICD-10-CM

## 2021-10-08 DIAGNOSIS — O41.03X0 OLIGOHYDRAMNIOS IN THIRD TRIMESTER, SINGLE OR UNSPECIFIED FETUS: ICD-10-CM

## 2021-10-08 DIAGNOSIS — A60.04 HERPES SIMPLEX VULVOVAGINITIS: ICD-10-CM

## 2021-10-08 DIAGNOSIS — Z3A.37 37 WEEKS GESTATION OF PREGNANCY: ICD-10-CM

## 2021-10-08 PROBLEM — Z34.90 PREGNANT: Status: ACTIVE | Noted: 2021-10-08

## 2021-10-08 LAB
ABO GROUP BLD: NORMAL
BILIRUB BLD-MCNC: NEGATIVE MG/DL
BLD GP AB SCN SERPL QL: NEGATIVE
DEPRECATED RDW RBC AUTO: 46.4 FL (ref 37–54)
ERYTHROCYTE [DISTWIDTH] IN BLOOD BY AUTOMATED COUNT: 15.8 % (ref 12.3–15.4)
GLUCOSE UR STRIP-MCNC: NEGATIVE MG/DL
HCT VFR BLD AUTO: 37.4 % (ref 34–46.6)
HGB BLD-MCNC: 12.1 G/DL (ref 12–15.9)
KETONES UR QL: NEGATIVE
LEUKOCYTE EST, POC: NEGATIVE
MCH RBC QN AUTO: 26.4 PG (ref 26.6–33)
MCHC RBC AUTO-ENTMCNC: 32.4 G/DL (ref 31.5–35.7)
MCV RBC AUTO: 81.5 FL (ref 79–97)
NITRITE UR-MCNC: NEGATIVE MG/ML
PH UR: 7 [PH] (ref 5–8)
PLATELET # BLD AUTO: 241 10*3/MM3 (ref 140–450)
PMV BLD AUTO: 10.2 FL (ref 6–12)
PROT UR STRIP-MCNC: NEGATIVE MG/DL
RBC # BLD AUTO: 4.59 10*6/MM3 (ref 3.77–5.28)
RBC # UR STRIP: ABNORMAL /UL
RH BLD: NEGATIVE
SARS-COV-2 RNA RESP QL NAA+PROBE: NOT DETECTED
SP GR UR: 1.02 (ref 1–1.03)
T&S EXPIRATION DATE: NORMAL
UROBILINOGEN UR QL: NORMAL
WBC # BLD AUTO: 8.93 10*3/MM3 (ref 3.4–10.8)

## 2021-10-08 PROCEDURE — 85027 COMPLETE CBC AUTOMATED: CPT | Performed by: OBSTETRICS & GYNECOLOGY

## 2021-10-08 PROCEDURE — U0003 INFECTIOUS AGENT DETECTION BY NUCLEIC ACID (DNA OR RNA); SEVERE ACUTE RESPIRATORY SYNDROME CORONAVIRUS 2 (SARS-COV-2) (CORONAVIRUS DISEASE [COVID-19]), AMPLIFIED PROBE TECHNIQUE, MAKING USE OF HIGH THROUGHPUT TECHNOLOGIES AS DESCRIBED BY CMS-2020-01-R: HCPCS | Performed by: OBSTETRICS & GYNECOLOGY

## 2021-10-08 PROCEDURE — 0502F SUBSEQUENT PRENATAL CARE: CPT | Performed by: OBSTETRICS & GYNECOLOGY

## 2021-10-08 PROCEDURE — 86901 BLOOD TYPING SEROLOGIC RH(D): CPT | Performed by: OBSTETRICS & GYNECOLOGY

## 2021-10-08 PROCEDURE — 86900 BLOOD TYPING SEROLOGIC ABO: CPT | Performed by: OBSTETRICS & GYNECOLOGY

## 2021-10-08 PROCEDURE — 86850 RBC ANTIBODY SCREEN: CPT | Performed by: OBSTETRICS & GYNECOLOGY

## 2021-10-08 PROCEDURE — 3E0P7VZ INTRODUCTION OF HORMONE INTO FEMALE REPRODUCTIVE, VIA NATURAL OR ARTIFICIAL OPENING: ICD-10-PCS | Performed by: OBSTETRICS & GYNECOLOGY

## 2021-10-08 RX ORDER — SODIUM CHLORIDE 0.9 % (FLUSH) 0.9 %
10 SYRINGE (ML) INJECTION EVERY 12 HOURS SCHEDULED
Status: DISCONTINUED | OUTPATIENT
Start: 2021-10-08 | End: 2021-10-10 | Stop reason: HOSPADM

## 2021-10-08 RX ORDER — ONDANSETRON 4 MG/1
4 TABLET, FILM COATED ORAL EVERY 6 HOURS PRN
Status: DISCONTINUED | OUTPATIENT
Start: 2021-10-08 | End: 2021-10-10 | Stop reason: HOSPADM

## 2021-10-08 RX ORDER — LIDOCAINE HYDROCHLORIDE 10 MG/ML
5 INJECTION, SOLUTION EPIDURAL; INFILTRATION; INTRACAUDAL; PERINEURAL AS NEEDED
Status: DISCONTINUED | OUTPATIENT
Start: 2021-10-08 | End: 2021-10-10 | Stop reason: HOSPADM

## 2021-10-08 RX ORDER — FAMOTIDINE 20 MG/1
20 TABLET, FILM COATED ORAL EVERY 12 HOURS SCHEDULED
Status: DISCONTINUED | OUTPATIENT
Start: 2021-10-08 | End: 2021-10-10 | Stop reason: HOSPADM

## 2021-10-08 RX ORDER — ONDANSETRON 2 MG/ML
4 INJECTION INTRAMUSCULAR; INTRAVENOUS ONCE AS NEEDED
Status: DISCONTINUED | OUTPATIENT
Start: 2021-10-08 | End: 2021-10-10 | Stop reason: HOSPADM

## 2021-10-08 RX ORDER — MAGNESIUM CARB/ALUMINUM HYDROX 105-160MG
30 TABLET,CHEWABLE ORAL ONCE
Status: DISCONTINUED | OUTPATIENT
Start: 2021-10-08 | End: 2021-10-10 | Stop reason: HOSPADM

## 2021-10-08 RX ORDER — FAMOTIDINE 10 MG/ML
20 INJECTION, SOLUTION INTRAVENOUS ONCE AS NEEDED
Status: DISCONTINUED | OUTPATIENT
Start: 2021-10-08 | End: 2021-10-10 | Stop reason: HOSPADM

## 2021-10-08 RX ORDER — FAMOTIDINE 10 MG/ML
20 INJECTION, SOLUTION INTRAVENOUS EVERY 12 HOURS SCHEDULED
Status: DISCONTINUED | OUTPATIENT
Start: 2021-10-08 | End: 2021-10-10 | Stop reason: HOSPADM

## 2021-10-08 RX ORDER — ONDANSETRON 2 MG/ML
4 INJECTION INTRAMUSCULAR; INTRAVENOUS EVERY 6 HOURS PRN
Status: DISCONTINUED | OUTPATIENT
Start: 2021-10-08 | End: 2021-10-10 | Stop reason: HOSPADM

## 2021-10-08 RX ORDER — DIPHENHYDRAMINE HYDROCHLORIDE 50 MG/ML
12.5 INJECTION INTRAMUSCULAR; INTRAVENOUS EVERY 8 HOURS PRN
Status: DISCONTINUED | OUTPATIENT
Start: 2021-10-08 | End: 2021-10-10 | Stop reason: HOSPADM

## 2021-10-08 RX ORDER — BUTORPHANOL TARTRATE 1 MG/ML
1 INJECTION, SOLUTION INTRAMUSCULAR; INTRAVENOUS
Status: DISCONTINUED | OUTPATIENT
Start: 2021-10-08 | End: 2021-10-10

## 2021-10-08 RX ORDER — EPHEDRINE SULFATE 50 MG/ML
5 INJECTION, SOLUTION INTRAVENOUS
Status: DISCONTINUED | OUTPATIENT
Start: 2021-10-08 | End: 2021-10-10 | Stop reason: HOSPADM

## 2021-10-08 RX ORDER — SODIUM CHLORIDE, SODIUM LACTATE, POTASSIUM CHLORIDE, CALCIUM CHLORIDE 600; 310; 30; 20 MG/100ML; MG/100ML; MG/100ML; MG/100ML
125 INJECTION, SOLUTION INTRAVENOUS CONTINUOUS
Status: DISCONTINUED | OUTPATIENT
Start: 2021-10-08 | End: 2021-10-10

## 2021-10-08 RX ORDER — SODIUM CHLORIDE 0.9 % (FLUSH) 0.9 %
10 SYRINGE (ML) INJECTION AS NEEDED
Status: DISCONTINUED | OUTPATIENT
Start: 2021-10-08 | End: 2021-10-10 | Stop reason: HOSPADM

## 2021-10-08 RX ADMIN — DINOPROSTONE 10 MG: 10 INSERT VAGINAL at 21:30

## 2021-10-08 NOTE — PROGRESS NOTES
Pt feeling well, good movement  Reviewed US 10/6  Has fetal echo on 10/12 for ? Aortic coarctation  Reviewed 38 week delivery  MILAN 6 today, NST cat 1   Reviewed IUGR with decreasing MILAN\  Recommend delivery, cervadil tonight  GBS neg  On valtrex, no lesions or prodrome  Spoke with MFM regarding pt, comfortable with course of action, possible aortic coarctation is low suspicion and would be mild IF present, ok to deliver at [facility]

## 2021-10-09 PROCEDURE — 59200 INSERT CERVICAL DILATOR: CPT | Performed by: OBSTETRICS & GYNECOLOGY

## 2021-10-09 PROCEDURE — 3E033VJ INTRODUCTION OF OTHER HORMONE INTO PERIPHERAL VEIN, PERCUTANEOUS APPROACH: ICD-10-PCS | Performed by: OBSTETRICS & GYNECOLOGY

## 2021-10-09 RX ORDER — MISOPROSTOL 200 UG/1
800 TABLET ORAL ONCE AS NEEDED
Status: DISCONTINUED | OUTPATIENT
Start: 2021-10-09 | End: 2021-10-10 | Stop reason: HOSPADM

## 2021-10-09 RX ORDER — OXYTOCIN-SODIUM CHLORIDE 0.9% IV SOLN 30 UNIT/500ML 30-0.9/5 UT/ML-%
999 SOLUTION INTRAVENOUS ONCE
Status: COMPLETED | OUTPATIENT
Start: 2021-10-09 | End: 2021-10-10

## 2021-10-09 RX ORDER — OXYTOCIN-SODIUM CHLORIDE 0.9% IV SOLN 30 UNIT/500ML 30-0.9/5 UT/ML-%
125 SOLUTION INTRAVENOUS CONTINUOUS PRN
Status: COMPLETED | OUTPATIENT
Start: 2021-10-10 | End: 2021-10-10

## 2021-10-09 RX ORDER — METHYLERGONOVINE MALEATE 0.2 MG/ML
200 INJECTION INTRAVENOUS ONCE AS NEEDED
Status: DISCONTINUED | OUTPATIENT
Start: 2021-10-09 | End: 2021-10-10 | Stop reason: HOSPADM

## 2021-10-09 RX ORDER — OXYTOCIN-SODIUM CHLORIDE 0.9% IV SOLN 30 UNIT/500ML 30-0.9/5 UT/ML-%
250 SOLUTION INTRAVENOUS CONTINUOUS PRN
Status: ACTIVE | OUTPATIENT
Start: 2021-10-09 | End: 2021-10-10

## 2021-10-09 RX ORDER — OXYTOCIN-SODIUM CHLORIDE 0.9% IV SOLN 30 UNIT/500ML 30-0.9/5 UT/ML-%
2-20 SOLUTION INTRAVENOUS
Status: DISCONTINUED | OUTPATIENT
Start: 2021-10-09 | End: 2021-10-10

## 2021-10-09 RX ORDER — CARBOPROST TROMETHAMINE 250 UG/ML
250 INJECTION, SOLUTION INTRAMUSCULAR
Status: DISCONTINUED | OUTPATIENT
Start: 2021-10-09 | End: 2021-10-10 | Stop reason: HOSPADM

## 2021-10-09 RX ADMIN — OXYTOCIN 2 MILLI-UNITS/MIN: 10 INJECTION INTRAVENOUS at 12:06

## 2021-10-09 NOTE — NURSING NOTE
See paper charting for heart tones and all patient care between 0100 and 0330 on 10/9/21.  Care provided by RAINER Dalton RN

## 2021-10-09 NOTE — H&P
[facility]  Obstetric History and Physical    Chief Complaint   Patient presents with   • Scheduled Induction     IOL for IUGR and oligo, +FM, denies ctx, LOF, VB       Subjective     Patient is a 36 y.o. female  currently at 37w5d, who presents for induction of labor due to IUGR. The pregnancy is also complicated by suspicion for coarctation of the aorta, however she did not keep fetal echo appointment and this is not confirmed. Dr. Knight had spoken with [facility], suspicion reasonably low but  echo recommended. Is okay to deliver at [facility].  She has a hx of HSV and has no current lesions, has been on valtrex suppression.   She is RH negative and spouse Rh negative, she has declined rhogam  She has a hx of LEEP for severe cervical dysplasia    Overnight, she received cervidil for ripening and notes irregular contractions, only mildly painful. She desires NCB, discussed nitrous    The following portions of the patients history were reviewed and updated as appropriate: current medications, allergies, past medical history, past surgical history, problem list and OB hx .       Prenatal Information:       Prenatal Labs for [facility] Patients  Lab Results   Component Value Date    HGB 12.1 10/08/2021    HEPBSAG Negative 2021    ABSCRN Negative 10/08/2021    [id number] Non Reactive 2021    HEPCVIRUSABY <0.1 2021       Prenatal Labs -- transcribed from paper records by Nurse  Lab Results   Component Value Date    ABO A 10/08/2021    RH Negative 10/08/2021    HEPBSAG Negative 2021    RPR Non Reactive 2021    [id number] Non Reactive 2021           Past OB History:       OB History    Para Term  AB Living   2 0 0 0 1 0   SAB IAB Ectopic Molar Multiple Live Births   1 0 0 0 0 0      # Outcome Date GA Lbr Cody/2nd Weight Sex Delivery Anes PTL Lv   2 Current            1 SAB 2016     SAB          Past Medical History: Past Medical  History:   Diagnosis Date   • Abnormal Pap smear of cervix    • Herpes     no outbreak currently 9/16   • Sickle cell trait (HCC)       Past Surgical History Past Surgical History:   Procedure Laterality Date   • CERVICAL BIOPSY  W/ LOOP ELECTRODE EXCISION     • D & C HYSTEROSCOPY N/A 9/3/2020    Procedure: HYSTEROSCOPY,POLYPECTOMY with myosure;  Surgeon: Cari Ovalle MD;  [place] [facility] [place] [facility] [place]  Service: Gynecology;  Laterality: N/A;   • MANDIBLE FRACTURE SURGERY     • MANDIBLE SURGERY     • WISDOM TOOTH EXTRACTION        Family History: Family History   Problem Relation Age of Onset   • Hypertension Father    • Ovarian cancer Paternal Aunt    • Throat cancer Maternal Aunt    • No Known Problems Mother    • Hypertension Brother    • Dementia Paternal Grandmother    • No Known Problems Maternal Grandmother    • No Known Problems Maternal Grandfather    • Malig Hyperthermia Neg Hx       Social History:  reports that she has never smoked. She has never used smokeless tobacco.   reports no history of alcohol use.   reports no history of drug use.        General ROS: Pertinent items are noted in HPI, all other systems reviewed and negative    Objective       Vital Signs Range for the last 24 hours  Temperature: Temp:  [98 °F (36.7 °C)-98.6 °F (37 °C)] 98.4 °F (36.9 °C)   Temp Source: Temp src: Oral   BP: BP: ()/(56-86) 113/68   Pulse: Heart Rate:  [56-82] 76   Respirations: Resp:  [16-18] 18   SPO2: SpO2:  [99 %] 99 %   O2 Amount (l/min):     O2 Devices Device (Oxygen Therapy): room air   Weight:       Physical Examination: General appearance - alert, well appearing, and in no distress  Chest - no tachypnea, retractions or cyanosis  Abdomen - contractions palpate mild, fundus soft between and nontender  Pelvic - normal external genitalia, vulva, vagina, cervix, uterus and adnexa, no HSV lesions noted, adequate pelvimetry. Difficulty with cervical exam.  Neurological - alert, oriented, normal speech, no  focal findings or movement disorder noted  Extremities - no pedal edema noted  Skin - normal coloration and turgor, no rashes, no suspicious skin lesions noted    Presentation: vertex   Cervix: Exam by: Method: sterile exam per physician   Dilation: Cervical Dilation (cm): 1   Effacement: Cervical Effacement: 70-80%   Station: Fetal Station: -3       Fetal Heart Rate Assessment   Method: Fetal HR Assessment Method: external   Beats/min: Fetal HR (beats/min): 125   Baseline: Fetal Heart Baseline Rate: normal range   Varibility: Fetal HR Variability: moderate (amplitude range 6 to 25 bpm)   Accels: Fetal HR Accelerations: greater than/equal to 15 bpm, lasting at least 15 seconds   Decels: Fetal HR Decelerations: absent   Tracing Category:       Uterine Assessment   Method: Method: external tocotransducer   Frequency (min): Contraction Frequency (Minutes): 4-6   Ctx Count in 10 min:     Duration:     Intensity: Contraction Intensity: moderate by palpation   Intensity by IUPC:     Resting Tone: Uterine Resting Tone: soft by palpation   Resting Tone by IUPC:     Elizabeth Units:       Results from last 7 days   Lab Units 10/08/21  2006   WBC 10*3/mm3 8.93   HEMOGLOBIN g/dL 12.1   HEMATOCRIT % 37.4   PLATELETS 10*3/mm3 241       Assessment/Plan       Blood type, Rh negative    History of cervical dysplasia    History of loop electrosurgical excision procedure (LEEP) of cervix affecting pregnancy, antepartum    Herpes simplex vulvovaginitis    AMA (advanced maternal age) multigravida 35+    Coarctation of aorta, fetal, affecting care of mother    Maternal care for poor fetal growth in third trimester    Poor fetal growth affecting management of mother in third trimester    Pregnant        Assessment:  1.  Intrauterine pregnancy at 37w5d weeks gestation with reactive fetal status.    2.  Induction of labor for intrauterine growth restriction and oligohydramnios.  Hx HSV, possible fetal coarctation, Rh negative  3.   Obstetrical history benign  4.  GBS status: negative    Plan:  1. Overall EFW 12% and AC 2%, induction of labor recommended. Cervical ripening with cervidil overnight and plan to start pitocin. She desires NCB, also she is a good candidate for nitrous oxide.  Possible coarctation of the aorta,  will need echo after delivery. Has been seen by  nurse practitioner.   No HSV lesions visible   Rh  Negative, she has declined rhogam  2. Plan of care has been reviewed with patient and her   3.  Risks, benefits of treatment plan have been discussed.  4.  All questions have been answered.      Rachel Quintanilla MD  2021  14:44 EDT

## 2021-10-10 PROCEDURE — 59400 OBSTETRICAL CARE: CPT | Performed by: OBSTETRICS & GYNECOLOGY

## 2021-10-10 PROCEDURE — 82803 BLOOD GASES ANY COMBINATION: CPT

## 2021-10-10 PROCEDURE — 10907ZC DRAINAGE OF AMNIOTIC FLUID, THERAPEUTIC FROM PRODUCTS OF CONCEPTION, VIA NATURAL OR ARTIFICIAL OPENING: ICD-10-PCS | Performed by: OBSTETRICS & GYNECOLOGY

## 2021-10-10 PROCEDURE — 88307 TISSUE EXAM BY PATHOLOGIST: CPT

## 2021-10-10 PROCEDURE — 0KQM0ZZ REPAIR PERINEUM MUSCLE, OPEN APPROACH: ICD-10-PCS | Performed by: OBSTETRICS & GYNECOLOGY

## 2021-10-10 RX ORDER — BISACODYL 10 MG
10 SUPPOSITORY, RECTAL RECTAL DAILY PRN
Status: DISCONTINUED | OUTPATIENT
Start: 2021-10-11 | End: 2021-10-12 | Stop reason: HOSPADM

## 2021-10-10 RX ORDER — IBUPROFEN 800 MG/1
800 TABLET ORAL EVERY 8 HOURS PRN
Status: DISCONTINUED | OUTPATIENT
Start: 2021-10-10 | End: 2021-10-12 | Stop reason: HOSPADM

## 2021-10-10 RX ORDER — CARBOPROST TROMETHAMINE 250 UG/ML
250 INJECTION, SOLUTION INTRAMUSCULAR ONCE AS NEEDED
Status: DISCONTINUED | OUTPATIENT
Start: 2021-10-10 | End: 2021-10-12 | Stop reason: HOSPADM

## 2021-10-10 RX ORDER — HYDROCORTISONE 25 MG/G
1 CREAM TOPICAL AS NEEDED
Status: DISCONTINUED | OUTPATIENT
Start: 2021-10-10 | End: 2021-10-12 | Stop reason: HOSPADM

## 2021-10-10 RX ORDER — PHYTONADIONE 1 MG/.5ML
INJECTION, EMULSION INTRAMUSCULAR; INTRAVENOUS; SUBCUTANEOUS
Status: ACTIVE
Start: 2021-10-10 | End: 2021-10-11

## 2021-10-10 RX ORDER — SODIUM CHLORIDE 0.9 % (FLUSH) 0.9 %
1-10 SYRINGE (ML) INJECTION AS NEEDED
Status: DISCONTINUED | OUTPATIENT
Start: 2021-10-10 | End: 2021-10-12 | Stop reason: HOSPADM

## 2021-10-10 RX ORDER — PROMETHAZINE HYDROCHLORIDE 25 MG/1
25 TABLET ORAL EVERY 6 HOURS PRN
Status: DISCONTINUED | OUTPATIENT
Start: 2021-10-10 | End: 2021-10-12 | Stop reason: HOSPADM

## 2021-10-10 RX ORDER — PROMETHAZINE HYDROCHLORIDE 12.5 MG/1
12.5 SUPPOSITORY RECTAL EVERY 6 HOURS PRN
Status: DISCONTINUED | OUTPATIENT
Start: 2021-10-10 | End: 2021-10-12 | Stop reason: HOSPADM

## 2021-10-10 RX ORDER — TEMAZEPAM 7.5 MG/1
7.5 CAPSULE ORAL NIGHTLY PRN
Status: DISCONTINUED | OUTPATIENT
Start: 2021-10-10 | End: 2021-10-12 | Stop reason: HOSPADM

## 2021-10-10 RX ORDER — OXYCODONE HYDROCHLORIDE AND ACETAMINOPHEN 5; 325 MG/1; MG/1
1 TABLET ORAL EVERY 4 HOURS PRN
Status: DISCONTINUED | OUTPATIENT
Start: 2021-10-10 | End: 2021-10-12 | Stop reason: HOSPADM

## 2021-10-10 RX ORDER — ONDANSETRON 2 MG/ML
4 INJECTION INTRAMUSCULAR; INTRAVENOUS EVERY 6 HOURS PRN
Status: DISCONTINUED | OUTPATIENT
Start: 2021-10-10 | End: 2021-10-12 | Stop reason: HOSPADM

## 2021-10-10 RX ORDER — PROMETHAZINE HYDROCHLORIDE 12.5 MG/1
12.5 TABLET ORAL EVERY 4 HOURS PRN
Status: DISCONTINUED | OUTPATIENT
Start: 2021-10-10 | End: 2021-10-12 | Stop reason: HOSPADM

## 2021-10-10 RX ORDER — ERYTHROMYCIN 5 MG/G
OINTMENT OPHTHALMIC
Status: ACTIVE
Start: 2021-10-10 | End: 2021-10-11

## 2021-10-10 RX ORDER — ONDANSETRON 4 MG/1
4 TABLET, FILM COATED ORAL EVERY 8 HOURS PRN
Status: DISCONTINUED | OUTPATIENT
Start: 2021-10-10 | End: 2021-10-12 | Stop reason: HOSPADM

## 2021-10-10 RX ORDER — OXYCODONE AND ACETAMINOPHEN 7.5; 325 MG/1; MG/1
1 TABLET ORAL EVERY 4 HOURS PRN
Status: DISCONTINUED | OUTPATIENT
Start: 2021-10-10 | End: 2021-10-12 | Stop reason: HOSPADM

## 2021-10-10 RX ORDER — DOCUSATE SODIUM 100 MG/1
100 CAPSULE, LIQUID FILLED ORAL 2 TIMES DAILY
Status: DISCONTINUED | OUTPATIENT
Start: 2021-10-10 | End: 2021-10-12 | Stop reason: HOSPADM

## 2021-10-10 RX ORDER — MISOPROSTOL 200 UG/1
600 TABLET ORAL ONCE AS NEEDED
Status: DISCONTINUED | OUTPATIENT
Start: 2021-10-10 | End: 2021-10-12 | Stop reason: HOSPADM

## 2021-10-10 RX ADMIN — OXYTOCIN 999 ML/HR: 10 INJECTION INTRAVENOUS at 19:18

## 2021-10-10 RX ADMIN — SODIUM CHLORIDE, POTASSIUM CHLORIDE, SODIUM LACTATE AND CALCIUM CHLORIDE 125 ML/HR: 600; 310; 30; 20 INJECTION, SOLUTION INTRAVENOUS at 10:17

## 2021-10-10 RX ADMIN — SODIUM CHLORIDE, POTASSIUM CHLORIDE, SODIUM LACTATE AND CALCIUM CHLORIDE 125 ML/HR: 600; 310; 30; 20 INJECTION, SOLUTION INTRAVENOUS at 18:26

## 2021-10-10 RX ADMIN — OXYTOCIN 18 MILLI-UNITS/MIN: 10 INJECTION INTRAVENOUS at 08:44

## 2021-10-10 RX ADMIN — LIDOCAINE HYDROCHLORIDE 5 ML: 10 INJECTION, SOLUTION EPIDURAL; INFILTRATION; INTRACAUDAL; PERINEURAL at 19:20

## 2021-10-10 RX ADMIN — OXYTOCIN 125 ML/HR: 10 INJECTION INTRAVENOUS at 20:40

## 2021-10-10 RX ADMIN — SODIUM CHLORIDE, POTASSIUM CHLORIDE, SODIUM LACTATE AND CALCIUM CHLORIDE 125 ML/HR: 600; 310; 30; 20 INJECTION, SOLUTION INTRAVENOUS at 02:09

## 2021-10-10 NOTE — PLAN OF CARE
Problem: Adult Inpatient Plan of Care  Goal: Plan of Care Review  Outcome: Ongoing, Progressing  Flowsheets (Taken 10/10/2021 0603)  Progress: no change  Plan of Care Reviewed With:   patient   spouse  Outcome Summary: pt hasn't really changed her cervix much over night, has been doing well with the ctx  Goal: Patient-Specific Goal (Individualized)  Outcome: Ongoing, Progressing  Goal: Absence of Hospital-Acquired Illness or Injury  Outcome: Ongoing, Progressing  Intervention: Identify and Manage Fall Risk  Recent Flowsheet Documentation  Taken 10/9/2021 1927 by Delia Tang, RN  Safety Promotion/Fall Prevention: safety round/check completed  Goal: Optimal Comfort and Wellbeing  Outcome: Ongoing, Progressing  Intervention: Provide Person-Centered Care  Recent Flowsheet Documentation  Taken 10/9/2021 1927 by Delia Tang, RN  Trust Relationship/Rapport: care explained  Goal: Readiness for Transition of Care  Outcome: Ongoing, Progressing     Problem: Bleeding (Labor)  Goal: Hemostasis  Outcome: Ongoing, Progressing     Problem: Change in Fetal Wellbeing (Labor)  Goal: Stable Fetal Wellbeing  Outcome: Ongoing, Progressing     Problem: Delayed Labor Progression (Labor)  Goal: Effective Progression to Delivery  Outcome: Ongoing, Progressing     Problem: Infection (Labor)  Goal: Absence of Infection Signs and Symptoms  Outcome: Ongoing, Progressing     Problem: Labor Pain (Labor)  Goal: Acceptable Pain Control  Outcome: Ongoing, Progressing     Problem: Uterine Tachysystole (Labor)  Goal: Normal Uterine Contraction Pattern  Outcome: Ongoing, Progressing     Problem:  Fall Injury Risk  Goal: Absence of Fall, Infant Drop and Related Injury  Outcome: Ongoing, Progressing  Intervention: Identify and Manage Contributors to Fall Injury Risk  Recent Flowsheet Documentation  Taken 10/9/2021 1927 by Delia Tang, RN  Medication Review/Management: medications reviewed  Intervention: Promote Injury-Free  Environment  Recent Flowsheet Documentation  Taken 10/9/2021 1927 by Delia Tang, RN  Safety Promotion/Fall Prevention: safety round/check completed   Goal Outcome Evaluation:  Plan of Care Reviewed With: patient, spouse        Progress: no change  Outcome Summary: pt hasn't really changed her cervix much over night, has been doing well with the ctx

## 2021-10-10 NOTE — L&D DELIVERY NOTE
[facility]   Obstetrics and Gynecology       Vaginal Delivery Note    10/10/2021    Patient:Gustabo Jacome    MR#:[id number]    36 y.o. yo female  at 37w6d    Pre-delivery diagnosis:  Intrauterine pregnancy at 37w6d  Intrauterine growth restriction in the third trimester  Advanced maternal age  Possible coarctation of the fetal aorta  Induction of labor for growth restriction    Post-delivery diagnosis  Same       Patient Active Problem List   Diagnosis    Blood type, Rh negative    History of cervical dysplasia    History of loop electrosurgical excision procedure (LEEP) of cervix affecting pregnancy, antepartum    Herpes simplex vulvovaginitis    AMA (advanced maternal age) multigravida 35+    Sickle cell trait (HCC)    Coarctation of aorta, fetal, affecting care of mother    Pregnancy    Maternal care for poor fetal growth in third trimester    Poor fetal growth affecting management of mother in third trimester    Pregnant     (normal spontaneous vaginal delivery)       Delivery     Delivery: Vaginal, Spontaneous     YOB: 2021    Time of Birth: 7:14 PM      Anesthesia: Local     Delivering clinician: Patrick Zhang    Forceps?   No   Vacuum? No    Shoulder dystocia present: No        Delivery narrative:  The patient is admitted for IOL.  Multiple induction agents were employed including Cervidil and Pitocin alternating.  Pitocin was started per protocol this a.m.  Amniotomy was performed 6 cm when the patient entered an active phase of labor.  The patient entered an active phase of labor and progressed along a normal labor curve to complete dilatation at +2 station.    The fetal tracing remained predominantly category I with brief and limited category II changes.    With 3 pushes, patient delivered a live viable female infant occiput anterior with no restitution and precipitous but controlled delivery.  The shoulders delivered without difficulty.  No nuchal cord was  identified. The body was delivered atraumatically.  The infant's nose and oropharynx were suctioned and cleared of secretions.  Cord clamping was delayed a minimum of 60 seconds then was clamped and cut.  The infant was placed on the mom's chest for bonding and care.    The placenta was expressed and delivered intact.      There was a left and right lateral sidewall laceration both repaired with 3-0 chromic a small right periurethral tear was noted but no bleeding was noted and the laceration was not repaired.    With some minimal bleeding from the suture holes that responded to pressure      EBL:    mL  Quant EBL:  Quantitative Blood Loss (mL): 358 mL      Summary:  Uncomplicated Vaginal delivery      Infant    Findings: female  infant     Infant observations: Weight: 2676 g (5 lb 14.4 oz)     Observations/Comments:  scale 4      Apgars: 9  @ 1 minute /    9  @ 5 minutes   Infant Name: Kamille     Placenta, Cord, and Fluid    Placenta delivered  Spontaneous  at  10/10/2021  7:18 PM     Cord: 3 vessels  present.   Nuchal Cord?  no   Cord blood obtained: Yes    Cord gases obtained:  Yes    Cord gas results: No results found for: PHCVEN         Repair    Episiotomy: No   Lacerations: Yes  Laceration Information  Laceration Repaired?   Perineal: 2nd  Yes    Periurethral:       Labial:       Sulcus:       Vaginal:       Cervical:         Suture used for repair: 3-0 Vicryl   Estimated Blood Loss:   mL         Complications  none    Disposition  Mother to Mother Baby/Postpartum  in stable condition currently.  Baby to NBN  in stable condition currently.    []  Labs reviewed:  Bld A neg   Rubella Imm  Varicella + hx    Immunization History   Administered Date(s) Administered    Rho (D) Immune Globulin 07/01/2016    Tdap 08/03/2021         Patrick Zhang MD  10/10/21  21:25 EDT

## 2021-10-10 NOTE — PROGRESS NOTES
[facility]   Obstetrics and Gynecology   Antepartum Note      10/10/2021    Name:Gustabo Jacome    MR#:[id number]    Progress note                        HD:2    Subjective     Chief Complaint   Patient presents with   • Scheduled Induction     IOL for IUGR and oligo, +FM, denies ctx, LOF, VB       36 y.o. yo Female  at 37w6d admitted for IOL for IUGR.  Patient is reporting regular painful contractions on 18 MIU of pitocin.      There is a possible fetal coarctation of the aorta.  Neos are aware and patent is clear to delivery at [facility]    Patient Active Problem List   Diagnosis   • Blood type, Rh negative   • History of cervical dysplasia   • History of loop electrosurgical excision procedure (LEEP) of cervix affecting pregnancy, antepartum   • Herpes simplex vulvovaginitis   • AMA (advanced maternal age) multigravida 35+   • Sickle cell trait (HCC)   • Coarctation of aorta, fetal, affecting care of mother   • Pregnancy   • Maternal care for poor fetal growth in third trimester   • Poor fetal growth affecting management of mother in third trimester   • Pregnant        Review of system  Review of Systems   Constitutional: Negative.    Respiratory: Negative.    Cardiovascular: Negative.    Gastrointestinal: Negative.    Genitourinary: Negative.    Psychiatric/Behavioral: Negative.        Objective    Vitals  Temp:  Temp:  [97.8 °F (36.6 °C)-98.8 °F (37.1 °C)] 97.8 °F (36.6 °C)  Temp src: Oral  BP:  BP: (101-128)/(62-94) 123/82  Pulse:  Heart Rate:  [61-72] 72  RR:   Resp:  [18] 18    Wt Readings from Last 3 Encounters:   10/08/21 84.8 kg (187 lb)   10/06/21 85.3 kg (188 lb)   10/01/21 84.6 kg (186 lb 9.6 oz)       Body mass index is 32.1 kg/m².    Exam:    Physical Exam  Vitals and nursing note reviewed.   Constitutional:       General: She is not in acute distress.     Appearance: Normal appearance. She is well-developed. She is not ill-appearing.   HENT:      Head: Normocephalic.   Pulmonary:       Effort: Pulmonary effort is normal.   Abdominal:      General: Abdomen is flat. There is no distension.      Palpations: Abdomen is soft. There is no mass.      Tenderness: There is no abdominal tenderness.   Genitourinary:     Vagina: Normal.   Musculoskeletal:         General: No swelling or tenderness.      Comments: No calf tenderness or swelling    Neurological:      Mental Status: She is alert and oriented to person, place, and time.   Psychiatric:         Behavior: Behavior normal.         Thought Content: Thought content normal.         Judgment: Judgment normal.         I/O last 3 completed shifts:  In: 2672.5 [I.V.:2672.5]  Out: 700 [Urine:700]    Results from last 7 days   Lab Units 10/08/21  2006   WBC 10*3/mm3 8.93   HEMOGLOBIN g/dL 12.1   HEMATOCRIT % 37.4   PLATELETS 10*3/mm3 241           Fetal Tracing:  Reactive, Category I         Assessment/Plan  1.  Intrauterine pregnancy at 37w6d    2.  IUGR in the third trimester     3.  AMA    4.  IOL    5.  Possible fetal aortic coarctation     Plan:   Continue labor induction     Patrick Zhang MD  10/10/2021 12:23 EDT

## 2021-10-10 NOTE — ANESTHESIA PREPROCEDURE EVALUATION
Anesthesia Evaluation     Patient summary reviewed and Nursing notes reviewed   no history of anesthetic complications:  NPO Solid Status: > 6 hours  NPO Liquid Status: > 6 hours           Airway   Mallampati: II  TM distance: >3 FB  Neck ROM: full  no difficulty expected and No difficulty expected  Dental - normal exam     Pulmonary - negative pulmonary ROS and normal exam    breath sounds clear to auscultation  (-) rhonchi, decreased breath sounds, wheezes, rales, stridor  Cardiovascular - negative cardio ROS and normal exam    NYHA Classification: I  Rhythm: regular  Rate: normal    (-) murmur, weak pulses, friction rub, systolic click, carotid bruits, JVD, peripheral edema      Neuro/Psych- negative ROS  GI/Hepatic/Renal/Endo - negative ROS     Musculoskeletal (-) negative ROS    Abdominal  - normal exam    Abdomen: soft.   Substance History - negative use     OB/GYN negative ob/gyn ROS         Other   blood dyscrasia,       Other Comment: Sickle Cell Trait                  Anesthesia Plan    ASA 2     epidural     intravenous induction     Anesthetic plan, all risks, benefits, and alternatives have been provided, discussed and informed consent has been obtained with: patient.

## 2021-10-10 NOTE — PLAN OF CARE
Problem: Adult Inpatient Plan of Care  Goal: Plan of Care Review  Outcome: Ongoing, Progressing  Flowsheets  Taken 10/10/2021 1621 by Meka Cerrato RN  Progress: improving  Outcome Summary: Pt is coping well with contractions, very little change to cervix since induction began, positive attitude, will reassess cervix in 1-2hours and decide further plan of care with Dr. Patrick Zhang.  Taken 10/10/2021 0603 by Delia Tang RN  Plan of Care Reviewed With:   patient   spouse  Goal: Patient-Specific Goal (Individualized)  Outcome: Ongoing, Progressing  Flowsheets (Taken 10/10/2021 1621)  Patient-Specific Goals (Include Timeframe): Healthy mom and baby, NCB, Breastfeeding  Individualized Care Needs: desires to be listened to and informed along the way, wants 1 minute of cord pulsing before clamping, support with ncb and breastfeeding  Anxieties, Fears or Concerns: Concerned about labor taking so long  Goal: Absence of Hospital-Acquired Illness or Injury  Outcome: Ongoing, Progressing  Intervention: Identify and Manage Fall Risk  Recent Flowsheet Documentation  Taken 10/10/2021 0830 by Meka Cerrato RN  Safety Promotion/Fall Prevention: safety round/check completed  Goal: Optimal Comfort and Wellbeing  Outcome: Ongoing, Progressing  Intervention: Provide Person-Centered Care  Recent Flowsheet Documentation  Taken 10/10/2021 0830 by Meka Cerrato RN  Trust Relationship/Rapport:   care explained   choices provided   emotional support provided   empathic listening provided   questions answered   questions encouraged   reassurance provided   thoughts/feelings acknowledged  Goal: Readiness for Transition of Care  Outcome: Ongoing, Progressing     Problem: Bleeding (Labor)  Goal: Hemostasis  Outcome: Ongoing, Progressing     Problem: Change in Fetal Wellbeing (Labor)  Goal: Stable Fetal Wellbeing  Outcome: Ongoing, Progressing     Problem: Delayed Labor Progression (Labor)  Goal: Effective Progression to  Delivery  Outcome: Ongoing, Progressing     Problem: Infection (Labor)  Goal: Absence of Infection Signs and Symptoms  Outcome: Ongoing, Progressing     Problem: Labor Pain (Labor)  Goal: Acceptable Pain Control  Outcome: Ongoing, Progressing     Problem: Uterine Tachysystole (Labor)  Goal: Normal Uterine Contraction Pattern  Outcome: Ongoing, Progressing     Problem:  Fall Injury Risk  Goal: Absence of Fall, Infant Drop and Related Injury  Outcome: Ongoing, Progressing  Intervention: Promote Injury-Free Environment  Recent Flowsheet Documentation  Taken 10/10/2021 7255 by Meka Cerrato, RN  Safety Promotion/Fall Prevention: safety round/check completed     Goal Outcome Evaluation:     Progress: improving  Outcome Summary: Pt is coping well with contractions, very little change to cervix since induction began, positive attitude, will reassess cervix in 1-2hours and decide further plan of care with Dr. Patrick Zhang.

## 2021-10-11 LAB
ATMOSPHERIC PRESS: 750.8 MMHG
BASE EXCESS BLDV CALC-SCNC: -1.7 MMOL/L (ref -2–2)
BASOPHILS # BLD AUTO: 0.03 10*3/MM3 (ref 0–0.2)
BASOPHILS NFR BLD AUTO: 0.2 % (ref 0–1.5)
BDY SITE: ABNORMAL
DEPRECATED RDW RBC AUTO: 48.9 FL (ref 37–54)
EOSINOPHIL # BLD AUTO: 0.07 10*3/MM3 (ref 0–0.4)
EOSINOPHIL NFR BLD AUTO: 0.5 % (ref 0.3–6.2)
ERYTHROCYTE [DISTWIDTH] IN BLOOD BY AUTOMATED COUNT: 16.1 % (ref 12.3–15.4)
HCO3 BLDV-SCNC: 22.1 MMOL/L (ref 22–28)
HCT VFR BLD AUTO: 31.3 % (ref 34–46.6)
HGB BLD-MCNC: 9.8 G/DL (ref 12–15.9)
IMM GRANULOCYTES # BLD AUTO: 0.09 10*3/MM3 (ref 0–0.05)
IMM GRANULOCYTES NFR BLD AUTO: 0.7 % (ref 0–0.5)
LYMPHOCYTES # BLD AUTO: 2.05 10*3/MM3 (ref 0.7–3.1)
LYMPHOCYTES NFR BLD AUTO: 15.1 % (ref 19.6–45.3)
MCH RBC QN AUTO: 25.8 PG (ref 26.6–33)
MCHC RBC AUTO-ENTMCNC: 31.3 G/DL (ref 31.5–35.7)
MCV RBC AUTO: 82.4 FL (ref 79–97)
MODALITY: ABNORMAL
MONOCYTES # BLD AUTO: 0.91 10*3/MM3 (ref 0.1–0.9)
MONOCYTES NFR BLD AUTO: 6.7 % (ref 5–12)
NEUTROPHILS NFR BLD AUTO: 10.47 10*3/MM3 (ref 1.7–7)
NEUTROPHILS NFR BLD AUTO: 76.8 % (ref 42.7–76)
NRBC BLD AUTO-RTO: 0 /100 WBC (ref 0–0.2)
PCO2 BLDV: 34.4 MM HG (ref 41–51)
PH BLDV: 7.42 PH UNITS (ref 7.31–7.41)
PLATELET # BLD AUTO: 225 10*3/MM3 (ref 140–450)
PMV BLD AUTO: 10.3 FL (ref 6–12)
PO2 BLDV: 29.1 MM HG (ref 35–45)
RBC # BLD AUTO: 3.8 10*6/MM3 (ref 3.77–5.28)
SAO2 % BLDCOA: 57 % (ref 92–99)
WBC # BLD AUTO: 13.62 10*3/MM3 (ref 3.4–10.8)

## 2021-10-11 PROCEDURE — 0503F POSTPARTUM CARE VISIT: CPT | Performed by: OBSTETRICS & GYNECOLOGY

## 2021-10-11 PROCEDURE — 85025 COMPLETE CBC W/AUTO DIFF WBC: CPT | Performed by: OBSTETRICS & GYNECOLOGY

## 2021-10-11 RX ADMIN — DOCUSATE SODIUM 100 MG: 100 CAPSULE, LIQUID FILLED ORAL at 09:04

## 2021-10-11 NOTE — PROGRESS NOTES
[facility]   Obstetrics and Gynecology     10/11/2021    Name:Gustabo Jacome   MR#:[id number]    Vaginal Delivery Progress Note    HD#3    Subjective   Postpartum Day 1: 36 y.o. yo Female  delivered at 37w6d  delivered a female  infant.     The patient feels well.  Her pain is controlled.    She is ambulating well.  Patient describes her bleeding as moderate lochia.    Breastfeeding: infant latching without difficulty.     Patient Active Problem List   Diagnosis   • Blood type, Rh negative   • History of cervical dysplasia   • History of loop electrosurgical excision procedure (LEEP) of cervix affecting pregnancy, antepartum   • Herpes simplex vulvovaginitis   • AMA (advanced maternal age) multigravida 35+   • Sickle cell trait (HCC)   • Coarctation of aorta, fetal, affecting care of mother   • Pregnancy   • Maternal care for poor fetal growth in third trimester   • Poor fetal growth affecting management of mother in third trimester   • Pregnant   •  (normal spontaneous vaginal delivery)       Objective   Vital Signs Range for the last 24 hours  Temp: Temp:  [97.5 °F (36.4 °C)-98.6 °F (37 °C)] 98.2 °F (36.8 °C) Temp src: Oral   BP: BP: (106-123)/(61-99) 109/69        Pulse: Heart Rate:  [53-82] 75  RR: Resp:  [16-18] 18  Weight:    BMI:  Body mass index is 32.1 kg/m².    Results from last 7 days   Lab Units 10/11/21  0618 10/08/21  2006   WBC 10*3/mm3 13.62* 8.93   HEMOGLOBIN g/dL 9.8* 12.1   HEMATOCRIT % 31.3* 37.4   PLATELETS 10*3/mm3 225 241           Physical Exam  Vitals and nursing note reviewed.   Constitutional:       General: She is not in acute distress.     Appearance: Normal appearance.   Cardiovascular:      Pulses: Normal pulses.   Pulmonary:      Effort: Pulmonary effort is normal.   Abdominal:      General: There is no distension.      Palpations: Abdomen is soft.      Tenderness: There is no abdominal tenderness. There is no guarding or rebound.   Genitourinary:      Comments: Uterus firm and below umbilicus  Musculoskeletal:         General: No swelling or tenderness.      Right lower leg: No edema.      Left lower leg: No edema.   Neurological:      Mental Status: She is alert and oriented to person, place, and time.   Psychiatric:         Mood and Affect: Mood normal.         Behavior: Behavior normal.         Assessment/Plan   1.  PPD# 1      Plan:   Continue routine postpartum care  Plan discharge tomorrow      Amberly Jackson MD  10/11/2021 11:19 EDT

## 2021-10-11 NOTE — PLAN OF CARE
Goal Outcome Evaluation:           Progress: improving  Outcome Summary: VSS, fundal assessments wnl, pain well controlled w/o medication, ambulating well, voiding, working on breastfeeding   Phone call from Juice, who said that he  Was ill today.    Juvenal Benton., D,  L.P.

## 2021-10-11 NOTE — PLAN OF CARE
Problem: Bleeding (Labor)  Goal: Hemostasis  Outcome: Met     Problem: Change in Fetal Wellbeing (Labor)  Goal: Stable Fetal Wellbeing  Outcome: Met  Intervention: Promote and Monitor Fetal Wellbeing  Recent Flowsheet Documentation  Taken 10/10/2021 1945 by Doreen Alejandro RN  Body Position:   position changed independently   sitting up in bed     Problem: Delayed Labor Progression (Labor)  Goal: Effective Progression to Delivery  Outcome: Met     Problem: Infection (Labor)  Goal: Absence of Infection Signs and Symptoms  Outcome: Met     Problem: Labor Pain (Labor)  Goal: Acceptable Pain Control  Outcome: Met     Problem: Uterine Tachysystole (Labor)  Goal: Normal Uterine Contraction Pattern  Outcome: Met     Problem:  Fall Injury Risk  Goal: Absence of Fall, Infant Drop and Related Injury  Outcome: Met  Intervention: Identify and Manage Contributors to Fall Injury Risk  Recent Flowsheet Documentation  Taken 10/10/2021 1945 by Doreen Alejandro RN  Medication Review/Management: medications reviewed  Intervention: Promote Injury-Free Environment  Recent Flowsheet Documentation  Taken 10/10/2021 1945 by Doreen Alejandro RN  Safety Promotion/Fall Prevention: safety round/check completed   Goal Outcome Evaluation:

## 2021-10-12 ENCOUNTER — APPOINTMENT (OUTPATIENT)
Dept: ULTRASOUND IMAGING | Facility: HOSPITAL | Age: 36
End: 2021-10-12

## 2021-10-12 VITALS
BODY MASS INDEX: 32.1 KG/M2 | RESPIRATION RATE: 16 BRPM | HEART RATE: 66 BPM | SYSTOLIC BLOOD PRESSURE: 116 MMHG | DIASTOLIC BLOOD PRESSURE: 78 MMHG | OXYGEN SATURATION: 97 % | TEMPERATURE: 98.1 F | HEIGHT: 64 IN

## 2021-10-12 PROCEDURE — 0503F POSTPARTUM CARE VISIT: CPT | Performed by: OBSTETRICS & GYNECOLOGY

## 2021-10-12 RX ORDER — IBUPROFEN 800 MG/1
800 TABLET ORAL EVERY 8 HOURS PRN
Qty: 30 TABLET | Refills: 0 | Status: SHIPPED | OUTPATIENT
Start: 2021-10-12 | End: 2022-10-17

## 2021-10-12 NOTE — DISCHARGE SUMMARY
[facility]   Obstetrics and Gynecology   Vaginal delivery Discharge Summary      Date of Admission: 10/8/2021    Date of Discharge:  10/12/2021    Patient: Gustabo Jacome      MR#:[id number]    Surgeon/OB: Patrick Zhang     Discharge Diagnosis: Vaginal Delivery at 37w6d, uncomplicated recovery    Procedures:  Vaginal, Spontaneous     10/10/2021    7:14 PM      Anesthesia:  Local     Presenting Problem/History of Present Illness  Pregnant [Z34.90]  Normal vaginal delivery [O80]     Patient Active Problem List   Diagnosis   • Blood type, Rh negative   • History of cervical dysplasia   • History of loop electrosurgical excision procedure (LEEP) of cervix affecting pregnancy, antepartum   • Herpes simplex vulvovaginitis   • AMA (advanced maternal age) multigravida 35+   • Sickle cell trait (HCC)   • Coarctation of aorta, fetal, affecting care of mother   • Pregnancy   • Maternal care for poor fetal growth in third trimester   • Poor fetal growth affecting management of mother in third trimester   • Pregnant   •  (normal spontaneous vaginal delivery)       Hospital Course  Patient is a 36 y.o. female  at 37w6d status post vaginal delivery.    Uneventful recovery.  Patient is ambulating, tolerating a regular diet.  Perineum is intact.    Infant:   female  fetus 2676 g (5 lb 14.4 oz)  with Apgar scores of 9 , 9  at five minutes.    Condition on Discharge:  Stable    Vital Signs  Temp:  [97.6 °F (36.4 °C)-98.4 °F (36.9 °C)] 98.1 °F (36.7 °C)  Heart Rate:  [66-83] 66  Resp:  [16] 16  BP: ()/(65-78) 116/78    Results from last 7 days   Lab Units 10/11/21  0618 10/08/21  2006   WBC 10*3/mm3 13.62* 8.93   HEMOGLOBIN g/dL 9.8* 12.1   HEMATOCRIT % 31.3* 37.4   PLATELETS 10*3/mm3 225 241             Discharge Disposition  Home or Self Care    Discharge Medications     Discharge Medications      New Medications      Instructions Start Date   ibuprofen 800 MG tablet  Commonly known as: ADVIL,MOTRIN    800 mg, Oral, Every 8 Hours PRN         Continue These Medications      Instructions Start Date   ferrous sulfate 325 (65 FE) MG tablet   325 mg, Oral, Daily With Breakfast      PNV Prenatal Plus Multivit+DHA 27-1 & 312 MG misc   TAKE 1 TAB & 1 CAPSULE BY MOUTH ONE TIME A DAY          Stop These Medications    aspirin 81 MG EC tablet     valACYclovir 500 MG tablet  Commonly known as: Valtrex            Discharge Diet: Regular    Activity at Discharge:     Follow-up Appointments  Future Appointments   Date Time Provider Department Center   11/19/2021 11:45 AM Gayathri Knight MD [facility]           Prenatal labs/vax:   Immunization History   Administered Date(s) Administered   • Rho (D) Immune Globulin 07/01/2016   • Tdap 08/03/2021         External Prenatal Results     Pregnancy Outside Results - Transcribed From Office Records - See Scanned Records For Details     Test Value Date Time    ABO  A  10/08/21 2006    Rh  Negative  10/08/21 2006    Antibody Screen  Negative  10/08/21 2006       Negative  08/03/21 1634       Negative  03/18/21 0857    Varicella IgG ^ positive history  04/12/21     Rubella  7.66 index 03/18/21 0857    Hgb  9.8 g/dL 10/11/21 0618       12.1 g/dL 10/08/21 2006       11.3 g/dL 08/03/21 1634       11.9 g/dL 06/23/21 1022       12.8 g/dL 03/18/21 0857    Hct  31.3 % 10/11/21 0618       37.4 % 10/08/21 2006       34.3 % 08/03/21 1634       35.7 % 06/23/21 1022       39.6 % 03/18/21 0857    Glucose Fasting GTT       Glucose Tolerance Test 1 hour       Glucose Tolerance Test 3 hour       Gonorrhea (discrete)  Negative  06/09/20 1422    Chlamydia (discrete)  Negative  06/09/20 1422    RPR  Non Reactive  03/18/21 0857    VDRL       Syphilis Antibody       HBsAg  Negative  03/18/21 0857    Herpes Simplex Virus PCR       Herpes Simplex VIrus Culture       HIV  Non Reactive  03/18/21 0857    Hep C RNA Quant PCR       Hep C Antibody  <0.1 s/co ratio 03/18/21 0857    AFP       Group B Strep   Negative  10/01/21 1041    GBS Susceptibility to Clindamycin       GBS Susceptibility to Erythromycin       Fetal Fibronectin       Genetic Testing, Maternal Blood             Drug Screening     Test Value Date Time    Urine Drug Screen       Amphetamine Screen       Barbiturate Screen       Benzodiazepine Screen       Methadone Screen       Phencyclidine Screen       Opiates Screen       THC Screen       Cocaine Screen       Propoxyphene Screen       Buprenorphine Screen       Methamphetamine Screen       Oxycodone Screen       Tricyclic Antidepressants Screen             Legend    ^: Historical                          Maxime Coyle MD  10/12/21  10:19 EDT

## 2021-10-12 NOTE — PROGRESS NOTES
Postpartum Vaginal Delivery Note PPD# 2    CC: post vaginal delivery    Subjective:  Patient reports she is doing well and would like to go home.  Baby had the echo done.  Patient reports she is not taking any pain medicine.  She is ambulating with no leg pain.  No complaints.  She was taking iron at home.  .    Vitals:   Patient Vitals for the past 24 hrs:   BP Temp Temp src Pulse Resp   10/12/21 0724 116/78 98.1 °F (36.7 °C) Oral 66 16   10/11/21 2325 99/65 97.6 °F (36.4 °C) Oral 66 16   10/11/21 1500 108/72 98.4 °F (36.9 °C) Oral 83 16         Exam:   Gen: NAD, cooperative, conversive  Resp: Nonlabored breathing  Abd: Soft, nondistended, fundus is firm below umbillicus, not tender  CV: Trace LE edema  Ext: Nontender bilaterally  Labs:  Recent Results (from the past 36 hour(s))   CBC Auto Differential    Collection Time: 10/11/21  6:18 AM    Specimen: Blood   Result Value Ref Range    WBC 13.62 (H) 3.40 - 10.80 10*3/mm3    RBC 3.80 3.77 - 5.28 10*6/mm3    Hemoglobin 9.8 (L) 12.0 - 15.9 g/dL    Hematocrit 31.3 (L) 34.0 - 46.6 %    MCV 82.4 79.0 - 97.0 fL    MCH 25.8 (L) 26.6 - 33.0 pg    MCHC 31.3 (L) 31.5 - 35.7 g/dL    RDW 16.1 (H) 12.3 - 15.4 %    RDW-SD 48.9 37.0 - 54.0 fl    MPV 10.3 6.0 - 12.0 fL    Platelets 225 140 - 450 10*3/mm3    Neutrophil % 76.8 (H) 42.7 - 76.0 %    Lymphocyte % 15.1 (L) 19.6 - 45.3 %    Monocyte % 6.7 5.0 - 12.0 %    Eosinophil % 0.5 0.3 - 6.2 %    Basophil % 0.2 0.0 - 1.5 %    Immature Grans % 0.7 (H) 0.0 - 0.5 %    Neutrophils, Absolute 10.47 (H) 1.70 - 7.00 10*3/mm3    Lymphocytes, Absolute 2.05 0.70 - 3.10 10*3/mm3    Monocytes, Absolute 0.91 (H) 0.10 - 0.90 10*3/mm3    Eosinophils, Absolute 0.07 0.00 - 0.40 10*3/mm3    Basophils, Absolute 0.03 0.00 - 0.20 10*3/mm3    Immature Grans, Absolute 0.09 (H) 0.00 - 0.05 10*3/mm3    nRBC 0.0 0.0 - 0.2 /100 WBC       Patient Active Problem List   Diagnosis   • Blood type, Rh negative   • History of cervical dysplasia   • History of loop  electrosurgical excision procedure (LEEP) of cervix affecting pregnancy, antepartum   • Herpes simplex vulvovaginitis   • AMA (advanced maternal age) multigravida 35+   • Sickle cell trait (HCC)   • Coarctation of aorta, fetal, affecting care of mother   • Pregnancy   • Maternal care for poor fetal growth in third trimester   • Poor fetal growth affecting management of mother in third trimester   • Pregnant   •  (normal spontaneous vaginal delivery)         Assessment and Plan:  Gustabo Jacome is a 36 y.o. female  s/p   1. Doing well, d/c home today  2. Precautions given-pelvic rest for 6 weeks  3. RTC in 6 weeks.   4. Ambulation encouraged.  5.  Continue iron for mild anemia.         Signed By:  Maxime Coyle MD       2021 10:12 EDT

## 2021-10-13 ENCOUNTER — APPOINTMENT (OUTPATIENT)
Dept: ULTRASOUND IMAGING | Facility: HOSPITAL | Age: 36
End: 2021-10-13

## 2021-10-18 ENCOUNTER — TELEPHONE (OUTPATIENT)
Dept: OBSTETRICS AND GYNECOLOGY | Age: 36
End: 2021-10-18

## 2021-10-18 NOTE — TELEPHONE ENCOUNTER
What does she need? A letter stating she wants to be off a certain amount of time? When does she want to return to work? I need more information.  The medical leave after vaginal delivery is 6 weeks.  Some people take up to 12 with family medical leave act but I think it is unpaid-   What does she need for me to say?

## 2021-10-20 NOTE — TELEPHONE ENCOUNTER
Pt wants letter stating she needs 6 additional weeks of for bonding and infant care and to avoid Covid at  until she is little older. I have started a letter in her chart if you will review.dn

## 2021-10-21 DIAGNOSIS — D58.2 ABNORMAL HEMOGLOBIN (HCC): Primary | ICD-10-CM

## 2021-11-19 ENCOUNTER — POSTPARTUM VISIT (OUTPATIENT)
Dept: OBSTETRICS AND GYNECOLOGY | Age: 36
End: 2021-11-19

## 2021-11-19 VITALS
HEIGHT: 64 IN | DIASTOLIC BLOOD PRESSURE: 74 MMHG | WEIGHT: 185 LBS | BODY MASS INDEX: 31.58 KG/M2 | SYSTOLIC BLOOD PRESSURE: 118 MMHG

## 2021-11-19 PROCEDURE — 0503F POSTPARTUM CARE VISIT: CPT | Performed by: OBSTETRICS & GYNECOLOGY

## 2021-11-19 NOTE — PROGRESS NOTES
"GYN Visit    2021    Patient: Gustabo Jacome          MR#:[id number]      Chief Complaint   Patient presents with   • Postpartum Follow-up     5w5d PP Check, vaginal delivery, baby girl, 5lbs 14.4oz, \"Kamille\", last pap 2019 (-), HPV (-)       History of Present Illness    36 y.o. female  who presents for  Pp check  Baby is well, nursing  Having significant PP baby blues, pt tearful  Would like to start zoloft and feels like she needs more time off work  UTD pap  Will do mirena for BCM        Patient's last menstrual period was 2021 (exact date).    ________________________________________  Patient Active Problem List   Diagnosis   • Blood type, Rh negative   • History of cervical dysplasia   • History of loop electrosurgical excision procedure (LEEP) of cervix affecting pregnancy, antepartum   • Herpes simplex vulvovaginitis   • AMA (advanced maternal age) multigravida 35+   • Sickle cell trait (HCC)   • Coarctation of aorta, fetal, affecting care of mother   • Pregnancy   • Maternal care for poor fetal growth in third trimester   • Poor fetal growth affecting management of mother in third trimester   • Pregnant   •  (normal spontaneous vaginal delivery)   • Abnormal hemoglobin (HCC)       Past Medical History:   Diagnosis Date   • Abnormal Pap smear of cervix    • Herpes     no outbreak currently    • Sickle cell trait (HCC)        Past Surgical History:   Procedure Laterality Date   • CERVICAL BIOPSY  W/ LOOP ELECTRODE EXCISION     • D & C HYSTEROSCOPY N/A 9/3/2020    Procedure: HYSTEROSCOPY,POLYPECTOMY with myosure;  Surgeon: Cari Ovalle MD;  [place] [facility] [place] [facility] [place]  Service: Gynecology;  Laterality: N/A;   • MANDIBLE FRACTURE SURGERY     • MANDIBLE SURGERY     • WISDOM TOOTH EXTRACTION         Social History     Tobacco Use   Smoking Status Never Smoker   Smokeless Tobacco Never Used       has a current medication list which includes the following prescription(s): ferrous " "sulfate, ibuprofen, pnv prenatal plus multivit+dha, and sertraline.  ________________________________________    Current contraception: none      The following portions of the patient's history were reviewed and updated as appropriate: allergies, current medications, past family history, past medical history, past social history, past surgical history and problem list.    Review of Systems    Pertinent items are noted in HPI.     Objective   Physical Exam    /74   Ht 162.6 cm (64\")   Wt 83.9 kg (185 lb)   LMP 01/18/2021 (Exact Date)   Breastfeeding Yes   BMI 31.76 kg/m²    BP Readings from Last 3 Encounters:   11/19/21 118/74   10/12/21 116/78   10/08/21 110/70      Wt Readings from Last 3 Encounters:   11/19/21 83.9 kg (185 lb)   10/08/21 84.8 kg (187 lb)   10/06/21 85.3 kg (188 lb)      BMI: Estimated body mass index is 31.76 kg/m² as calculated from the following:    Height as of this encounter: 162.6 cm (64\").    Weight as of this encounter: 83.9 kg (185 lb).    Lungs: non labored breathing, no wheezing or tachpnea  Extremities: extremities normal, atraumatic, no cyanosis or edema  Skin: Skin color, texture, turgor normal. No rashes or lesions  Neurologic: Grossly normal  General:   alert, appears stated age and cooperative   Abdomen: soft, non-tender, without masses or organomegaly       Vulva: normal, perineum well healed   Vagina: normal mucosa   Cervix: no cervical motion tenderness   Uterus: deferred   Adnexa: deferred     Assessment:      Diagnoses and all orders for this visit:    1. Postpartum care following vaginal delivery (Primary)    2. Postpartum depression    Other orders  -     sertraline (Zoloft) 50 MG tablet; Take 1 tablet by mouth Daily.  Dispense: 30 tablet; Refill: 2      Return to work Ricky 10  Pt seeing a therapist as well  Fu Jan 7 for IUD insert , mirena        "

## 2021-12-15 ENCOUNTER — TELEPHONE (OUTPATIENT)
Dept: OBSTETRICS AND GYNECOLOGY | Age: 36
End: 2021-12-15

## 2021-12-15 NOTE — TELEPHONE ENCOUNTER
"Pt calls stating her job is asking for her work letter to state what the exact issue is that is keeping her off work. Pt asking if we can replace the word \"issues\" with \"postpartum depression and anxiety\" from her letter created on 11/19. Pt wanting it faxed to [phone], Please advise if ok to make this change  "

## 2021-12-16 ENCOUNTER — TELEPHONE (OUTPATIENT)
Dept: OBSTETRICS AND GYNECOLOGY | Age: 36
End: 2021-12-16

## 2021-12-16 NOTE — TELEPHONE ENCOUNTER
Patient called today regarding her back to work letter.  I faxed the letter to [phone] (Attn Carmen), per pt's request.

## 2022-01-03 ENCOUNTER — TELEPHONE (OUTPATIENT)
Dept: OBSTETRICS AND GYNECOLOGY | Age: 37
End: 2022-01-03

## 2022-01-03 NOTE — TELEPHONE ENCOUNTER
Pt has been scheduled tomorrow at 12:30, pt called back asking for a televisit stating she will not be able to come in due to her  not being able ride with her and entertain the baby. PT needs to be seen before 01/10 this is when she is due to go back to work. Please advise

## 2022-01-04 ENCOUNTER — OFFICE VISIT (OUTPATIENT)
Dept: OBSTETRICS AND GYNECOLOGY | Age: 37
End: 2022-01-04

## 2022-01-04 DIAGNOSIS — Z30.09 ENCOUNTER FOR OTHER GENERAL COUNSELING OR ADVICE ON CONTRACEPTION: ICD-10-CM

## 2022-01-04 PROCEDURE — 99441 PR PHYS/QHP TELEPHONE EVALUATION 5-10 MIN: CPT | Performed by: OBSTETRICS & GYNECOLOGY

## 2022-01-04 NOTE — PROGRESS NOTES
GYN Visit    2022    Patient: Gustabo Jacome          MR#:[id number]      Chief Complaint   Patient presents with   • Depression   • Contraception       History of Present Illness    36 y.o. female  who presents for  Telephone visit   Permission to proceed discussed   Pt has 2 issues  1. Pp depression and anxiety, improved with zoloft  No SI or HI  Getting on a good schedule  Wants to extend leave the full amount to 22  No counseling currently but she seems to be improving and feels certain she is ready to return end of January    2. Wants mirena IUD  Nursing , now 11 weeks  Will authorize and place in the next month         No LMP recorded (lmp unknown).    ________________________________________  Patient Active Problem List   Diagnosis   • Blood type, Rh negative   • History of cervical dysplasia   • History of loop electrosurgical excision procedure (LEEP) of cervix affecting pregnancy, antepartum   • Herpes simplex vulvovaginitis   • AMA (advanced maternal age) multigravida 35+   • Sickle cell trait (HCC)   • Coarctation of aorta, fetal, affecting care of mother   • Pregnancy   • Maternal care for poor fetal growth in third trimester   • Poor fetal growth affecting management of mother in third trimester   • Pregnant   •  (normal spontaneous vaginal delivery)   • Abnormal hemoglobin (HCC)       Past Medical History:   Diagnosis Date   • Abnormal Pap smear of cervix    • Herpes     no outbreak currently    • Sickle cell trait (HCC)        Past Surgical History:   Procedure Laterality Date   • CERVICAL BIOPSY  W/ LOOP ELECTRODE EXCISION     • D & C HYSTEROSCOPY N/A 9/3/2020    Procedure: HYSTEROSCOPY,POLYPECTOMY with myosure;  Surgeon: Cari Ovalle MD;  [place] [facility] [place];  Service: Gynecology;  Laterality: N/A;   • MANDIBLE FRACTURE SURGERY     • MANDIBLE SURGERY     • WISDOM TOOTH EXTRACTION         Social History     Tobacco Use   Smoking Status Never Smoker   Smokeless  "Tobacco Never Used       has a current medication list which includes the following prescription(s): ferrous sulfate, ibuprofen, pnv prenatal plus multivit+dha, and sertraline.  ________________________________________    Current contraception: condoms      The following portions of the patient's history were reviewed and updated as appropriate: allergies, current medications, past family history, past medical history, past social history, past surgical history and problem list.    Review of Systems    Pertinent items are noted in HPI.     Objective   Physical Exam    LMP  (LMP Unknown)    BP Readings from Last 3 Encounters:   11/19/21 118/74   10/12/21 116/78   10/08/21 110/70      Wt Readings from Last 3 Encounters:   11/19/21 83.9 kg (185 lb)   10/08/21 84.8 kg (187 lb)   10/06/21 85.3 kg (188 lb)      BMI: Estimated body mass index is 31.76 kg/m² as calculated from the following:    Height as of 11/19/21: 162.6 cm (64\").    Weight as of 11/19/21: 83.9 kg (185 lb).    Lungs: non labored breathing, no wheezing or tachpnea    General:   alert, appears stated age and cooperative                                 Assessment:      Diagnoses and all orders for this visit:    1. Postpartum depression (Primary)    2. Encounter for other general counseling or advice on contraception      Extend leave to 1/26/22  Continue zoloft at current dose  Authorize and place mirena at first opportunity        "

## 2022-01-04 NOTE — TELEPHONE ENCOUNTER
Yes, we can do televisit, but caution to be patient as we are not doing these often and I may end up late to visit

## 2022-10-17 ENCOUNTER — OFFICE VISIT (OUTPATIENT)
Dept: OBSTETRICS AND GYNECOLOGY | Age: 37
End: 2022-10-17

## 2022-10-17 VITALS
BODY MASS INDEX: 28.68 KG/M2 | WEIGHT: 168 LBS | HEIGHT: 64 IN | SYSTOLIC BLOOD PRESSURE: 120 MMHG | DIASTOLIC BLOOD PRESSURE: 70 MMHG

## 2022-10-17 DIAGNOSIS — D57.3 SICKLE CELL TRAIT: ICD-10-CM

## 2022-10-17 DIAGNOSIS — O09.523 MULTIGRAVIDA OF ADVANCED MATERNAL AGE IN THIRD TRIMESTER: ICD-10-CM

## 2022-10-17 DIAGNOSIS — O09.521 AMA (ADVANCED MATERNAL AGE) MULTIGRAVIDA 35+, FIRST TRIMESTER: ICD-10-CM

## 2022-10-17 DIAGNOSIS — Z3A.01 LESS THAN 8 WEEKS GESTATION OF PREGNANCY: Primary | ICD-10-CM

## 2022-10-17 PROCEDURE — 76817 TRANSVAGINAL US OBSTETRIC: CPT | Performed by: OBSTETRICS & GYNECOLOGY

## 2022-10-17 PROCEDURE — 99214 OFFICE O/P EST MOD 30 MIN: CPT | Performed by: NURSE PRACTITIONER

## 2022-10-17 RX ORDER — VITAMIN A, ASCORBIC ACID, CHOLECALCIFEROL, TOCOPHEROL, THIAMINE MONONITRATE, RIBOFLAVIN, PYRIDOXINE, FOLIC ACID, CYANOCOBALAMIN, CALCIUM CARBONATE, FERROUS FUMARATE, ZINC OXIDE, CUPRIC OXIDE, NIACINAMIDE, AND FISH OIL 27-1-250MG
1 KIT ORAL DAILY
Qty: 60 EACH | Refills: 12 | Status: SHIPPED | OUTPATIENT
Start: 2022-10-17

## 2022-10-17 NOTE — PROGRESS NOTES
"Samuel Jacome is a 37 y.o. female is being seen today for   Chief Complaint   Patient presents with   • Possible Pregnancy     Lmp 07/11/2022   .    History of Present Illness     Patient of Dr Knight  Still breastfeeding but was hoping for pregnancy  Unsure of LMP  Had a period in July but then some spotting September  Had + UCG last week  Starting some nausea but feeling well overall    The following portions of the patient's history were reviewed and updated as appropriate: allergies, current medications, past family history, past medical history, past social history, past surgical history and problem list.    /70   Ht 161.3 cm (63.5\")   Wt 76.2 kg (168 lb)   LMP 07/11/2022   Breastfeeding Yes   BMI 29.29 kg/m²         Review of Systems   Constitutional: Negative.    HENT: Negative.    Eyes: Negative.    Respiratory: Negative.    Cardiovascular: Negative.    Gastrointestinal: Negative.    Endocrine: Negative.    Genitourinary: Negative.    Musculoskeletal: Negative.    Skin: Negative.    Allergic/Immunologic: Negative.    Neurological: Negative.    Hematological: Negative.    Psychiatric/Behavioral: Negative.        Objective   Physical Exam  Constitutional:       Appearance: She is well-developed.   Cardiovascular:      Rate and Rhythm: Normal rate and regular rhythm.   Pulmonary:      Effort: Pulmonary effort is normal.   Abdominal:      General: Bowel sounds are normal. There is no distension.      Palpations: Abdomen is soft.      Tenderness: There is no abdominal tenderness.   Skin:     General: Skin is warm and dry.   Neurological:      Mental Status: She is alert and oriented to person, place, and time.   Psychiatric:         Behavior: Behavior normal.           Assessment & Plan   Diagnoses and all orders for this visit:    1. Less than 8 weeks gestation of pregnancy (Primary)    2. AMA (advanced maternal age) multigravida 35+, first trimester  -     US Ob Transvaginal  -     OB " Panel With HIV  -     Chlamydia trachomatis, Neisseria gonorrhoeae, PCR - Urine, Urine, Clean Catch  -     Urine Culture - Urine, Urine, Clean Catch    3. Multigravida of advanced maternal age in third trimester    Other orders  -     Prenatal Vit-Fe Fum-FA-Omega (PNV Prenatal Plus Multivit+DHA) 27-1 & 312 MG misc; Take 1 tablet by mouth Daily.  Dispense: 60 each; Refill: 12      Ultrasound done, 6w6d, + IUP and FHT    Early pregnancy counseling provided   Patient is taking Prenatal vitamins  Problem list reviewed and updated  Reviewed routine prenatal care with the office to include but not limited to expected weight gain during pregnancy, Tylenol products are fine, avoid aspirin and ibuprofen; Zika (travel restrictions/ok to use insect repellant); not to change cat litter; food restrictions; avoidance of alcohol, tobacco, drugs and saunas/hot tubs.   All questions answered.  SAB warnings  PNV sent, instructed to take extra folic acid as well  Plan new ob intake in 4 weeks       Total time spent today with Gustabo  was 20-29 minutes (level 3).  Greater than 50% of the time was spent coordinating care, answering her questions and counseling regarding pathophysiology of her presenting problem along with plans for any diagnositc work-up and treatment.

## 2022-10-19 LAB
ABO GROUP BLD: ABNORMAL
BACTERIA UR CULT: NORMAL
BACTERIA UR CULT: NORMAL
BASOPHILS # BLD AUTO: 0.1 X10E3/UL (ref 0–0.2)
BASOPHILS NFR BLD AUTO: 1 %
BLD GP AB SCN SERPL QL: NEGATIVE
C TRACH RRNA SPEC QL NAA+PROBE: NEGATIVE
EOSINOPHIL # BLD AUTO: 0.1 X10E3/UL (ref 0–0.4)
EOSINOPHIL NFR BLD AUTO: 1 %
ERYTHROCYTE [DISTWIDTH] IN BLOOD BY AUTOMATED COUNT: 14.3 % (ref 11.7–15.4)
HBV SURFACE AG SERPL QL IA: NEGATIVE
HCT VFR BLD AUTO: 39.3 % (ref 34–46.6)
HCV AB S/CO SERPL IA: <0.1 S/CO RATIO (ref 0–0.9)
HGB BLD-MCNC: 12.6 G/DL (ref 11.1–15.9)
HIV 1+2 AB+HIV1 P24 AG SERPL QL IA: NON REACTIVE
IMM GRANULOCYTES # BLD AUTO: 0 X10E3/UL (ref 0–0.1)
IMM GRANULOCYTES NFR BLD AUTO: 0 %
LYMPHOCYTES # BLD AUTO: 2.5 X10E3/UL (ref 0.7–3.1)
LYMPHOCYTES NFR BLD AUTO: 27 %
MCH RBC QN AUTO: 24.7 PG (ref 26.6–33)
MCHC RBC AUTO-ENTMCNC: 32.1 G/DL (ref 31.5–35.7)
MCV RBC AUTO: 77 FL (ref 79–97)
MONOCYTES # BLD AUTO: 0.4 X10E3/UL (ref 0.1–0.9)
MONOCYTES NFR BLD AUTO: 4 %
N GONORRHOEA RRNA SPEC QL NAA+PROBE: NEGATIVE
NEUTROPHILS # BLD AUTO: 6.2 X10E3/UL (ref 1.4–7)
NEUTROPHILS NFR BLD AUTO: 67 %
PLATELET # BLD AUTO: 377 X10E3/UL (ref 150–450)
RBC # BLD AUTO: 5.1 X10E6/UL (ref 3.77–5.28)
RH BLD: NEGATIVE
RPR SER QL: NON REACTIVE
RUBV IGG SERPL IA-ACNC: 6.43 INDEX
WBC # BLD AUTO: 9.2 X10E3/UL (ref 3.4–10.8)

## 2022-11-10 ENCOUNTER — INITIAL PRENATAL (OUTPATIENT)
Dept: OBSTETRICS AND GYNECOLOGY | Age: 37
End: 2022-11-10

## 2022-11-10 VITALS — SYSTOLIC BLOOD PRESSURE: 120 MMHG | BODY MASS INDEX: 29.61 KG/M2 | WEIGHT: 169.8 LBS | DIASTOLIC BLOOD PRESSURE: 66 MMHG

## 2022-11-10 DIAGNOSIS — Z3A.10 10 WEEKS GESTATION OF PREGNANCY: Primary | ICD-10-CM

## 2022-11-10 DIAGNOSIS — Z13.89 SCREENING FOR HEMATURIA OR PROTEINURIA: ICD-10-CM

## 2022-11-10 DIAGNOSIS — O09.521 MULTIGRAVIDA OF ADVANCED MATERNAL AGE IN FIRST TRIMESTER: ICD-10-CM

## 2022-11-10 DIAGNOSIS — Z34.81 SUPERVISION OF NORMAL INTRAUTERINE PREGNANCY IN MULTIGRAVIDA, FIRST TRIMESTER: ICD-10-CM

## 2022-11-10 LAB
GLUCOSE UR STRIP-MCNC: NEGATIVE MG/DL
PROT UR STRIP-MCNC: NEGATIVE MG/DL

## 2022-11-10 PROCEDURE — 0501F PRENATAL FLOW SHEET: CPT | Performed by: OBSTETRICS & GYNECOLOGY

## 2022-11-10 NOTE — PROGRESS NOTES
Pt feels ok, nausea, infrequent vomiting  Panorama today, declines horizon  Declines flu vac  Recommend ASA daily at 12 weeks until 36 weeks  Still nursing her 13 mo old daughter  Follow up 4 weeks

## 2022-12-05 ENCOUNTER — ROUTINE PRENATAL (OUTPATIENT)
Dept: OBSTETRICS AND GYNECOLOGY | Age: 37
End: 2022-12-05

## 2022-12-05 VITALS — WEIGHT: 170 LBS | DIASTOLIC BLOOD PRESSURE: 68 MMHG | SYSTOLIC BLOOD PRESSURE: 122 MMHG | BODY MASS INDEX: 29.64 KG/M2

## 2022-12-05 DIAGNOSIS — O09.521 MULTIGRAVIDA OF ADVANCED MATERNAL AGE IN FIRST TRIMESTER: ICD-10-CM

## 2022-12-05 DIAGNOSIS — Z3A.13 13 WEEKS GESTATION OF PREGNANCY: Primary | ICD-10-CM

## 2022-12-05 DIAGNOSIS — Z13.89 SCREENING FOR HEMATURIA OR PROTEINURIA: ICD-10-CM

## 2022-12-05 LAB
GLUCOSE UR STRIP-MCNC: NEGATIVE MG/DL
PROT UR STRIP-MCNC: NEGATIVE MG/DL

## 2022-12-05 PROCEDURE — 0502F SUBSEQUENT PRENATAL CARE: CPT | Performed by: OBSTETRICS & GYNECOLOGY

## 2022-12-05 NOTE — PROGRESS NOTES
Pt feels well, no issues  Still nursing  Hand held used, good FHTs  Follow up 5 weeks for AFP and US anatomy

## 2022-12-19 ENCOUNTER — ROUTINE PRENATAL (OUTPATIENT)
Dept: OBSTETRICS AND GYNECOLOGY | Age: 37
End: 2022-12-19

## 2022-12-19 ENCOUNTER — TELEPHONE (OUTPATIENT)
Dept: OBSTETRICS AND GYNECOLOGY | Age: 37
End: 2022-12-19

## 2022-12-19 VITALS — SYSTOLIC BLOOD PRESSURE: 124 MMHG | DIASTOLIC BLOOD PRESSURE: 72 MMHG | BODY MASS INDEX: 30.16 KG/M2 | WEIGHT: 173 LBS

## 2022-12-19 DIAGNOSIS — B37.31 YEAST VAGINITIS: ICD-10-CM

## 2022-12-19 DIAGNOSIS — Z3A.15 15 WEEKS GESTATION OF PREGNANCY: Primary | ICD-10-CM

## 2022-12-19 DIAGNOSIS — O09.521 MULTIGRAVIDA OF ADVANCED MATERNAL AGE IN FIRST TRIMESTER: ICD-10-CM

## 2022-12-19 DIAGNOSIS — N89.8 VAGINAL DISCHARGE: ICD-10-CM

## 2022-12-19 DIAGNOSIS — Z13.89 SCREENING FOR HEMATURIA OR PROTEINURIA: ICD-10-CM

## 2022-12-19 LAB
BILIRUB BLD-MCNC: NEGATIVE MG/DL
CLARITY, POC: CLEAR
COLOR UR: YELLOW
GLUCOSE UR STRIP-MCNC: NEGATIVE MG/DL
KETONES UR QL: NEGATIVE
LEUKOCYTE EST, POC: NEGATIVE
NITRITE UR-MCNC: NEGATIVE MG/ML
PH UR: 6 [PH] (ref 5–8)
PROT UR STRIP-MCNC: NEGATIVE MG/DL
RBC # UR STRIP: NEGATIVE /UL
SP GR UR: 1.02 (ref 1–1.03)
UROBILINOGEN UR QL: ABNORMAL

## 2022-12-19 PROCEDURE — 0502F SUBSEQUENT PRENATAL CARE: CPT | Performed by: OBSTETRICS & GYNECOLOGY

## 2022-12-19 NOTE — PROGRESS NOTES
Yeast symptoms, discharge and itching  Also worried about baby  US reassuring  Obvious yeast vaginitis on exam, will do swab to check for BV as well  Pt is reassured, follow up as scheduled

## 2022-12-19 NOTE — TELEPHONE ENCOUNTER
Pt  vomiting all day yesterday, and is concerned might have affected baby.Pt currently able to keep down fluids and food but would like to be seen to check out baby. Pt also states vagina cramping.dn

## 2022-12-22 LAB
A VAGINAE DNA VAG QL NAA+PROBE: ABNORMAL SCORE
BVAB2 DNA VAG QL NAA+PROBE: ABNORMAL SCORE
C ALBICANS DNA VAG QL NAA+PROBE: POSITIVE
C GLABRATA DNA VAG QL NAA+PROBE: NEGATIVE
C TRACH DNA VAG QL NAA+PROBE: NEGATIVE
MEGA1 DNA VAG QL NAA+PROBE: ABNORMAL SCORE
N GONORRHOEA DNA VAG QL NAA+PROBE: NEGATIVE
T VAGINALIS DNA VAG QL NAA+PROBE: NEGATIVE

## 2023-01-09 ENCOUNTER — ROUTINE PRENATAL (OUTPATIENT)
Dept: OBSTETRICS AND GYNECOLOGY | Age: 38
End: 2023-01-09
Payer: COMMERCIAL

## 2023-01-09 VITALS — BODY MASS INDEX: 30.86 KG/M2 | SYSTOLIC BLOOD PRESSURE: 122 MMHG | DIASTOLIC BLOOD PRESSURE: 64 MMHG | WEIGHT: 177 LBS

## 2023-01-09 DIAGNOSIS — Z3A.18 18 WEEKS GESTATION OF PREGNANCY: Primary | ICD-10-CM

## 2023-01-09 DIAGNOSIS — Z13.89 SCREENING FOR HEMATURIA OR PROTEINURIA: ICD-10-CM

## 2023-01-09 DIAGNOSIS — O09.522 MULTIGRAVIDA OF ADVANCED MATERNAL AGE IN SECOND TRIMESTER: ICD-10-CM

## 2023-01-09 DIAGNOSIS — Z36.89 SCREENING, ANTENATAL, FOR FETAL ANATOMIC SURVEY: ICD-10-CM

## 2023-01-09 LAB
GLUCOSE UR STRIP-MCNC: NEGATIVE MG/DL
PROT UR STRIP-MCNC: NEGATIVE MG/DL

## 2023-01-09 PROCEDURE — 0502F SUBSEQUENT PRENATAL CARE: CPT | Performed by: OBSTETRICS & GYNECOLOGY

## 2023-01-09 NOTE — PROGRESS NOTES
Pt feels improved  See US- normal anatomy and c/w dates  CL 5 cm  Declined flu vac  Taking ASA daily  Fu 4 weeks

## 2023-02-06 ENCOUNTER — ROUTINE PRENATAL (OUTPATIENT)
Dept: OBSTETRICS AND GYNECOLOGY | Age: 38
End: 2023-02-06
Payer: COMMERCIAL

## 2023-02-06 VITALS — DIASTOLIC BLOOD PRESSURE: 72 MMHG | SYSTOLIC BLOOD PRESSURE: 122 MMHG | WEIGHT: 177 LBS | BODY MASS INDEX: 30.86 KG/M2

## 2023-02-06 DIAGNOSIS — O09.522 MULTIGRAVIDA OF ADVANCED MATERNAL AGE IN SECOND TRIMESTER: ICD-10-CM

## 2023-02-06 DIAGNOSIS — Z67.91 BLOOD TYPE, RH NEGATIVE: ICD-10-CM

## 2023-02-06 DIAGNOSIS — Z3A.22 22 WEEKS GESTATION OF PREGNANCY: Primary | ICD-10-CM

## 2023-02-06 DIAGNOSIS — Z13.89 SCREENING FOR BLOOD OR PROTEIN IN URINE: ICD-10-CM

## 2023-02-06 DIAGNOSIS — D57.3 SICKLE CELL TRAIT: ICD-10-CM

## 2023-02-06 DIAGNOSIS — A60.04 HERPES SIMPLEX VULVOVAGINITIS: ICD-10-CM

## 2023-02-06 DIAGNOSIS — N89.8 VAGINAL DISCHARGE: ICD-10-CM

## 2023-02-06 LAB
GLUCOSE UR STRIP-MCNC: NEGATIVE MG/DL
PROT UR STRIP-MCNC: NEGATIVE MG/DL

## 2023-02-06 PROCEDURE — 0502F SUBSEQUENT PRENATAL CARE: CPT | Performed by: PHYSICIAN ASSISTANT

## 2023-02-06 NOTE — PROGRESS NOTES
Chief Complaint   Patient presents with   • Routine Prenatal Visit     22 week ob visit       HPI: 38 y.o.  at 22w6d gestation  She is noting vaginal discharge  Unsure if there is odor, unable to smell or taste, did do a covid test, that was negative  Wants to be tested to see if it is amniotic fluid  Has been unable to take any of the usual meds d/t them being out of stock  Suggest claritin and/or benadryl  Has good FM  Cervix exam done and d/c noted, appears c/w yeast  Will send meds to treat given history and also send culture  She denies increased intake of sugar and no h/o GDM  Will send in terazole but may wait for final results  Will plan 1 hour gtt anv       Vitals:    23 1449   BP: 122/72   Weight: 80.3 kg (177 lb)       ROS:  GI:  Negative  : na  Pulmonary: Negative     A/P  1. Intrauterine pregnancy at 22w6d   2. Pregnancy Risk:  HIGH RISK    Diagnoses and all orders for this visit:    1. 22 weeks gestation of pregnancy (Primary)  -     POC Urinalysis Dipstick, Multipro    2. Screening for blood or protein in urine  -     POC Urinalysis Dipstick, Multipro    3. Multigravida of advanced maternal age in second trimester    4. Blood type, Rh negative    5. Sickle cell trait (HCC)    6. Herpes simplex vulvovaginitis    7. Vaginal discharge  -     NuSwab BV & Candida - Swab, Cervix        -----------------------  PLAN:   Return in about 4 weeks (around 3/6/2023) for 1 hour gtt .      DELFINO Harmon  2023 15:20 EST

## 2023-02-07 LAB
A VAGINAE DNA VAG QL NAA+PROBE: ABNORMAL SCORE
BVAB2 DNA VAG QL NAA+PROBE: ABNORMAL SCORE
C ALBICANS DNA VAG QL NAA+PROBE: POSITIVE
C GLABRATA DNA VAG QL NAA+PROBE: NEGATIVE
MEGA1 DNA VAG QL NAA+PROBE: ABNORMAL SCORE

## 2023-02-09 NOTE — PROGRESS NOTES
Pt notified, pt has not picked up medication yet because she wanted to wait for results to come back. Pt agrees to  Rx today

## 2023-03-06 ENCOUNTER — ROUTINE PRENATAL (OUTPATIENT)
Dept: OBSTETRICS AND GYNECOLOGY | Age: 38
End: 2023-03-06
Payer: COMMERCIAL

## 2023-03-06 VITALS — DIASTOLIC BLOOD PRESSURE: 66 MMHG | SYSTOLIC BLOOD PRESSURE: 104 MMHG | BODY MASS INDEX: 30.37 KG/M2 | WEIGHT: 174.2 LBS

## 2023-03-06 DIAGNOSIS — Z3A.26 26 WEEKS GESTATION OF PREGNANCY: Primary | ICD-10-CM

## 2023-03-06 DIAGNOSIS — Z13.89 SCREENING FOR BLOOD OR PROTEIN IN URINE: ICD-10-CM

## 2023-03-06 LAB
GLUCOSE UR STRIP-MCNC: NEGATIVE MG/DL
PROT UR STRIP-MCNC: NEGATIVE MG/DL

## 2023-03-06 PROCEDURE — 0502F SUBSEQUENT PRENATAL CARE: CPT | Performed by: OBSTETRICS & GYNECOLOGY

## 2023-03-06 RX ORDER — ASPIRIN 81 MG/1
81 TABLET ORAL DAILY
COMMUNITY

## 2023-03-06 NOTE — PROGRESS NOTES
Pt feeling well, occ side pain, round ligament type  Taking ASA  1 hr GCT today  Declines rhogam,  is O-  Fu 2 weeks

## 2023-03-07 LAB
ERYTHROCYTE [DISTWIDTH] IN BLOOD BY AUTOMATED COUNT: 14.4 % (ref 11.7–15.4)
GLUCOSE 1H P 50 G GLC PO SERPL-MCNC: 82 MG/DL (ref 70–139)
HCT VFR BLD AUTO: 36.7 % (ref 34–46.6)
HGB BLD-MCNC: 11.9 G/DL (ref 11.1–15.9)
MCH RBC QN AUTO: 25.4 PG (ref 26.6–33)
MCHC RBC AUTO-ENTMCNC: 32.4 G/DL (ref 31.5–35.7)
MCV RBC AUTO: 78 FL (ref 79–97)
PLATELET # BLD AUTO: 290 X10E3/UL (ref 150–450)
RBC # BLD AUTO: 4.69 X10E6/UL (ref 3.77–5.28)
WBC # BLD AUTO: 9.7 X10E3/UL (ref 3.4–10.8)

## 2023-03-20 ENCOUNTER — ROUTINE PRENATAL (OUTPATIENT)
Dept: OBSTETRICS AND GYNECOLOGY | Age: 38
End: 2023-03-20
Payer: COMMERCIAL

## 2023-03-20 VITALS — WEIGHT: 177.8 LBS | BODY MASS INDEX: 31 KG/M2 | SYSTOLIC BLOOD PRESSURE: 110 MMHG | DIASTOLIC BLOOD PRESSURE: 78 MMHG

## 2023-03-20 DIAGNOSIS — O09.522 MULTIGRAVIDA OF ADVANCED MATERNAL AGE IN SECOND TRIMESTER: ICD-10-CM

## 2023-03-20 DIAGNOSIS — Z3A.28 28 WEEKS GESTATION OF PREGNANCY: Primary | ICD-10-CM

## 2023-03-20 PROCEDURE — 0502F SUBSEQUENT PRENATAL CARE: CPT | Performed by: OBSTETRICS & GYNECOLOGY

## 2023-04-05 ENCOUNTER — ROUTINE PRENATAL (OUTPATIENT)
Dept: OBSTETRICS AND GYNECOLOGY | Age: 38
End: 2023-04-05
Payer: COMMERCIAL

## 2023-04-05 VITALS — DIASTOLIC BLOOD PRESSURE: 68 MMHG | BODY MASS INDEX: 31.39 KG/M2 | WEIGHT: 180 LBS | SYSTOLIC BLOOD PRESSURE: 110 MMHG

## 2023-04-05 DIAGNOSIS — O09.523 MULTIGRAVIDA OF ADVANCED MATERNAL AGE IN THIRD TRIMESTER: ICD-10-CM

## 2023-04-05 DIAGNOSIS — D57.3 SICKLE CELL TRAIT: ICD-10-CM

## 2023-04-05 DIAGNOSIS — Z67.91 BLOOD TYPE, RH NEGATIVE: ICD-10-CM

## 2023-04-05 DIAGNOSIS — O35.BXX0 COARCTATION OF AORTA, FETAL, AFFECTING CARE OF MOTHER: ICD-10-CM

## 2023-04-05 DIAGNOSIS — O36.5930 MATERNAL CARE FOR POOR FETAL GROWTH IN THIRD TRIMESTER, SINGLE OR UNSPECIFIED FETUS: ICD-10-CM

## 2023-04-05 DIAGNOSIS — Z3A.31 31 WEEKS GESTATION OF PREGNANCY: Primary | ICD-10-CM

## 2023-04-05 DIAGNOSIS — O34.40 HISTORY OF LOOP ELECTROSURGICAL EXCISION PROCEDURE (LEEP) OF CERVIX AFFECTING PREGNANCY, ANTEPARTUM: ICD-10-CM

## 2023-04-05 DIAGNOSIS — Z98.890 HISTORY OF LOOP ELECTROSURGICAL EXCISION PROCEDURE (LEEP) OF CERVIX AFFECTING PREGNANCY, ANTEPARTUM: ICD-10-CM

## 2023-04-05 LAB
GLUCOSE UR STRIP-MCNC: NEGATIVE MG/DL
PROT UR STRIP-MCNC: NEGATIVE MG/DL

## 2023-04-05 PROCEDURE — 0502F SUBSEQUENT PRENATAL CARE: CPT | Performed by: PHYSICIAN ASSISTANT

## 2023-04-05 PROCEDURE — 90715 TDAP VACCINE 7 YRS/> IM: CPT | Performed by: PHYSICIAN ASSISTANT

## 2023-04-05 PROCEDURE — 90471 IMMUNIZATION ADMIN: CPT | Performed by: PHYSICIAN ASSISTANT

## 2023-04-05 NOTE — PROGRESS NOTES
Chief Complaint   Patient presents with   • Routine Prenatal Visit     31 week ob with bpp       HPI: 38 y.o.  at 31w1d gestation  Accompanied by   She is doing well  Has good FM  No c/o  BP is wnl, 110/68-UA neg/neg  BPP and EFW done today  BPP is 8/8  EFW-3 lbs 11 ozs, 33%, AC is 24%  Pt is rh negative but  is also  tdap given today  Has f/u scheduled on the   Call for any issues      Vitals:    23 0942   BP: 110/68   Weight: 81.6 kg (180 lb)       ROS:  GI:  Negative  : na  Pulmonary: Negative     A/P  1. Intrauterine pregnancy at 31w1d   2. Pregnancy Risk:  HIGH RISK    Diagnoses and all orders for this visit:    1. 31 weeks gestation of pregnancy (Primary)  -     POC Urinalysis Dipstick, Multipro    2. Multigravida of advanced maternal age in third trimester    3. Coarctation of aorta, fetal, affecting care of mother    4. Maternal care for poor fetal growth in third trimester, single or unspecified fetus    5. Blood type, Rh negative    6. Sickle cell trait    7. History of loop electrosurgical excision procedure (LEEP) of cervix affecting pregnancy, antepartum    Other orders  -     Tdap Vaccine Greater Than or Equal To 6yo IM        -----------------------  PLAN:   Return in about 2 weeks (around 2023).      DELFINO Harmon  2023 12:29 EDT

## 2023-04-17 ENCOUNTER — ROUTINE PRENATAL (OUTPATIENT)
Dept: OBSTETRICS AND GYNECOLOGY | Age: 38
End: 2023-04-17
Payer: COMMERCIAL

## 2023-04-17 VITALS — WEIGHT: 184 LBS | DIASTOLIC BLOOD PRESSURE: 76 MMHG | SYSTOLIC BLOOD PRESSURE: 122 MMHG | BODY MASS INDEX: 32.08 KG/M2

## 2023-04-17 DIAGNOSIS — D57.3 SICKLE CELL TRAIT: ICD-10-CM

## 2023-04-17 DIAGNOSIS — Z67.91 BLOOD TYPE, RH NEGATIVE: ICD-10-CM

## 2023-04-17 DIAGNOSIS — Z98.890 HISTORY OF LOOP ELECTROSURGICAL EXCISION PROCEDURE (LEEP) OF CERVIX AFFECTING PREGNANCY, ANTEPARTUM: ICD-10-CM

## 2023-04-17 DIAGNOSIS — A60.04 HERPES SIMPLEX VULVOVAGINITIS: ICD-10-CM

## 2023-04-17 DIAGNOSIS — O09.523 MULTIGRAVIDA OF ADVANCED MATERNAL AGE IN THIRD TRIMESTER: ICD-10-CM

## 2023-04-17 DIAGNOSIS — Z3A.32 32 WEEKS GESTATION OF PREGNANCY: Primary | ICD-10-CM

## 2023-04-17 DIAGNOSIS — O34.40 HISTORY OF LOOP ELECTROSURGICAL EXCISION PROCEDURE (LEEP) OF CERVIX AFFECTING PREGNANCY, ANTEPARTUM: ICD-10-CM

## 2023-04-17 LAB
GLUCOSE UR STRIP-MCNC: NEGATIVE MG/DL
PROT UR STRIP-MCNC: NEGATIVE MG/DL

## 2023-04-17 NOTE — PROGRESS NOTES
Chief Complaint   Patient presents with   • Routine Prenatal Visit     32 week ob visit (occ numbness in hand)       HPI: 38 y.o.  at 32w6d gestation  She is doing well  Does have great FM  Thinks she has occl [facility]?   Would recommend good hydration  Notes right hand numbness x 2  No other sx's  Unable to pinpoint specific fingers but sounds like carpal tunnel syndrome    utd on vaccines and doesn't require rhogam d/t  having rh negative blood type as well  Has f/u in 2 wks already with u/s  Call for any issues      Vitals:    23 1439   BP: 122/76   Weight: 83.5 kg (184 lb)       ROS:  GI:  Negative  : na  Pulmonary: Negative     A/P  1. Intrauterine pregnancy at 32w6d   2. Pregnancy Risk:  HIGH RISK    Diagnoses and all orders for this visit:    1. 32 weeks gestation of pregnancy (Primary)  -     POC Urinalysis Dipstick, Multipro    2. Sickle cell trait    3. Blood type, Rh negative    4. Herpes simplex vulvovaginitis    5. Multigravida of advanced maternal age in third trimester    6. History of loop electrosurgical excision procedure (LEEP) of cervix affecting pregnancy, antepartum        -----------------------  PLAN:   Return in about 2 weeks (around 2023) for as cheduled.      DELFINO Harmon  2023 15:06 EDT

## 2023-05-01 ENCOUNTER — ROUTINE PRENATAL (OUTPATIENT)
Dept: OBSTETRICS AND GYNECOLOGY | Age: 38
End: 2023-05-01
Payer: COMMERCIAL

## 2023-05-01 VITALS — WEIGHT: 186 LBS | SYSTOLIC BLOOD PRESSURE: 108 MMHG | BODY MASS INDEX: 32.43 KG/M2 | DIASTOLIC BLOOD PRESSURE: 72 MMHG

## 2023-05-01 DIAGNOSIS — Z3A.34 34 WEEKS GESTATION OF PREGNANCY: Primary | ICD-10-CM

## 2023-05-01 DIAGNOSIS — O09.523 AMA (ADVANCED MATERNAL AGE) MULTIGRAVIDA 35+, THIRD TRIMESTER: ICD-10-CM

## 2023-05-01 LAB
GLUCOSE UR STRIP-MCNC: NEGATIVE MG/DL
PROT UR STRIP-MCNC: NEGATIVE MG/DL

## 2023-05-01 NOTE — PROGRESS NOTES
Pt feels well, some carpal tunnel sx, some BHC occ  See US BPP 8/8, MILAN 12.75  Labor warnings and FMC,  GBS at fu  Weekly BPP

## 2023-05-08 ENCOUNTER — ROUTINE PRENATAL (OUTPATIENT)
Dept: OBSTETRICS AND GYNECOLOGY | Age: 38
End: 2023-05-08
Payer: COMMERCIAL

## 2023-05-08 VITALS — SYSTOLIC BLOOD PRESSURE: 108 MMHG | WEIGHT: 187 LBS | DIASTOLIC BLOOD PRESSURE: 68 MMHG | BODY MASS INDEX: 32.61 KG/M2

## 2023-05-08 DIAGNOSIS — Z3A.35 35 WEEKS GESTATION OF PREGNANCY: Primary | ICD-10-CM

## 2023-05-08 DIAGNOSIS — Z36.85 ANTENATAL SCREENING FOR STREPTOCOCCUS B: ICD-10-CM

## 2023-05-08 LAB
GLUCOSE UR STRIP-MCNC: NEGATIVE MG/DL
PROT UR STRIP-MCNC: NEGATIVE MG/DL

## 2023-05-08 RX ORDER — VALACYCLOVIR HYDROCHLORIDE 1 G/1
1000 TABLET, FILM COATED ORAL 2 TIMES DAILY
Qty: 30 TABLET | Refills: 0 | Status: SHIPPED | OUTPATIENT
Start: 2023-05-08

## 2023-05-08 NOTE — PROGRESS NOTES
Pt feels well, no issues  See US- growth is 22 %  GBS done  Labor warnings and FMC  Fu 1 week, weekly BPP

## 2023-05-10 LAB — GP B STREP DNA SPEC QL NAA+PROBE: NEGATIVE

## 2023-05-15 ENCOUNTER — ROUTINE PRENATAL (OUTPATIENT)
Dept: OBSTETRICS AND GYNECOLOGY | Age: 38
End: 2023-05-15
Payer: COMMERCIAL

## 2023-05-15 VITALS — DIASTOLIC BLOOD PRESSURE: 80 MMHG | BODY MASS INDEX: 32.92 KG/M2 | WEIGHT: 188.8 LBS | SYSTOLIC BLOOD PRESSURE: 124 MMHG

## 2023-05-15 DIAGNOSIS — Z3A.36 36 WEEKS GESTATION OF PREGNANCY: Primary | ICD-10-CM

## 2023-05-15 DIAGNOSIS — O09.523 AMA (ADVANCED MATERNAL AGE) MULTIGRAVIDA 35+, THIRD TRIMESTER: ICD-10-CM

## 2023-05-15 LAB
GLUCOSE UR STRIP-MCNC: NEGATIVE MG/DL
PROT UR STRIP-MCNC: NEGATIVE MG/DL

## 2023-05-15 NOTE — PROGRESS NOTES
Pt feeling well, good movement  BPP 8/8, MILAN 10  Recommend MIL 5/30 due to AMA  Pt reluctant, may consider 6/6  Reviewed AMA as risk factor for stillbirth, everything is reassuring today  Will consider  Labor warnings and [facility] [place]  Weekly BPP  Chief Complaint   Patient presents with   • Routine Prenatal Visit     Ob Check & US - Pt is 36w6d, Pt has no complaints today       HPI:  Pt presents for routine prenatal visit    ROS:  No fever or chills, no nausea or vomiting, no contractions, no leg pain, no LE edema, no leaking fluid, no bleeding, no headache, no dysuria  All other systems reviewed and negative    Exam:  See flow sheet  General:  Alert and oriented and no distress  Neck: no lymphadenopathy or thyromegaly  Lungs: non - labored breathing  Abd:  See flow sheet, fundus nontender  Ext: see flow sheet, non-tender bilateral , no lesions  Neuro: grossly normal    Assessment:/ PLAN:    Diagnoses and all orders for this visit:    1. 36 weeks gestation of pregnancy (Primary)  -     POC Urinalysis Dipstick    2. AMA (advanced maternal age) multigravida 35+, third trimester

## 2023-05-25 ENCOUNTER — ROUTINE PRENATAL (OUTPATIENT)
Dept: OBSTETRICS AND GYNECOLOGY | Age: 38
End: 2023-05-25
Payer: COMMERCIAL

## 2023-05-25 VITALS — BODY MASS INDEX: 32.75 KG/M2 | DIASTOLIC BLOOD PRESSURE: 78 MMHG | WEIGHT: 187.8 LBS | SYSTOLIC BLOOD PRESSURE: 118 MMHG

## 2023-05-25 DIAGNOSIS — Z3A.38 38 WEEKS GESTATION OF PREGNANCY: Primary | ICD-10-CM

## 2023-05-25 DIAGNOSIS — O09.523 AMA (ADVANCED MATERNAL AGE) MULTIGRAVIDA 35+, THIRD TRIMESTER: ICD-10-CM

## 2023-05-25 LAB
GLUCOSE UR STRIP-MCNC: NEGATIVE MG/DL
PROT UR STRIP-MCNC: NEGATIVE MG/DL

## 2023-05-25 NOTE — PROGRESS NOTES
Pt feels well  Agrees to schedule induction 6/6  Plans NCB  BPP 8/8, MILAN 8.14  Labor warnings and FMC

## 2023-05-30 NOTE — PROGRESS NOTES
Pt presents for OB intake.   HPI: pt notes mild fatigue and nausea. She denies bleeding but has some mild low cramping. She denies any other issues with complete ROS    O: see physical form    A/p: AMA: offered aneuploidy screening and pt desires today with fetal gender determination. Recommend targeted u/s and pt will consider. Order placed for imaging at 19 to 20 weeks. Pt advised she could cancel if she does not want to proceed. Recommend she start baby asa at 13 weeks through 36 weeks for preeclampsia prevention.     Hx of pos sickle trait: repeated hgb electrophoresis.  Recommend screening of her spouse and counseled that their offspring would be at increased risk of sickle disease. She notes understanding and reports she believes he has been tested. Advised surveillance for UTI recommended as she may be at increased risk of UTI/pyelo in pregnancy. Pt notes understanding. Repeat ucx obtained today.    Rh neg: discussed indications for rhogam. She notes spouse is Rh neg and she plans to bring in documentation.     Offered carrier screening and pt declines    Hx LEEP: f/u 4 weeks for cervical length u/s       Detail Level: Detailed Post-Care Instructions: I reviewed with the patient in detail post-care instructions. Patient is to wear sunprotection, and avoid picking at any of the treated lesions. Pt may apply Vaseline to crusted or scabbing areas. Anesthesia Volume In Cc: 0.5 Consent: The patient's consent was obtained including but not limited to risks of crusting, scabbing, blistering, scarring, darker or lighter pigmentary change, recurrence, incomplete removal and infection. Price (Use Numbers Only, No Special Characters Or $): 50 Anesthesia Type: 1% lidocaine with epinephrine

## 2023-06-01 ENCOUNTER — ROUTINE PRENATAL (OUTPATIENT)
Dept: OBSTETRICS AND GYNECOLOGY | Age: 38
End: 2023-06-01

## 2023-06-01 VITALS — BODY MASS INDEX: 32.92 KG/M2 | WEIGHT: 188.8 LBS | SYSTOLIC BLOOD PRESSURE: 128 MMHG | DIASTOLIC BLOOD PRESSURE: 82 MMHG

## 2023-06-01 DIAGNOSIS — O36.5939 SGA (SMALL FOR GESTATIONAL AGE), FETAL, AFFECTING CARE OF MOTHER, ANTEPARTUM, THIRD TRIMESTER, OTHER FETUS: ICD-10-CM

## 2023-06-01 DIAGNOSIS — Z3A.39 39 WEEKS GESTATION OF PREGNANCY: Primary | ICD-10-CM

## 2023-06-01 DIAGNOSIS — O09.523 AMA (ADVANCED MATERNAL AGE) MULTIGRAVIDA 35+, THIRD TRIMESTER: ICD-10-CM

## 2023-06-01 DIAGNOSIS — O28.8 AFI (AMNIOTIC FLUID INDEX) BORDERLINE LOW: ICD-10-CM

## 2023-06-01 LAB
GLUCOSE UR STRIP-MCNC: NEGATIVE MG/DL
PROT UR STRIP-MCNC: NEGATIVE MG/DL

## 2023-06-01 NOTE — PROGRESS NOTES
Pt feels well, some contractions  See US, EFW 6lb 4 oz- 8%  AC 8.6 %  MILAN 7.2  BPP 8/8  NST for 20 min for SGA, borderline fluid- reassuring, cat 1  NST ordered  Spoke frankly with pt and recommended induction today due to SGA and borderline MILAN- described increased risk of possible placental compromise and better to induce at this time, waiting could be a dubois and could also increase risk of CS, pt declines induction despite strong emphatic recommendation of induction.   Technically reassuring BPP and NST but trending of SGA and borderline MLIAN is concerning   PT DECLINES INDUCTION  Pt desires to experience natural childbirth   Next appt Monday and pt agrees to return to office for repeat BPP and consider induction on 6/6/23

## 2023-06-05 ENCOUNTER — HOSPITAL ENCOUNTER (INPATIENT)
Facility: HOSPITAL | Age: 38
LOS: 3 days | Discharge: HOME | End: 2023-06-08
Attending: OBSTETRICS & GYNECOLOGY | Admitting: STUDENT IN AN ORGANIZED HEALTH CARE EDUCATION/TRAINING PROGRAM
Payer: COMMERCIAL

## 2023-06-05 ENCOUNTER — PREP FOR SURGERY (OUTPATIENT)
Dept: OBSTETRICS AND GYNECOLOGY | Age: 38
End: 2023-06-05
Payer: COMMERCIAL

## 2023-06-05 ENCOUNTER — ROUTINE PRENATAL (OUTPATIENT)
Dept: OBSTETRICS AND GYNECOLOGY | Age: 38
End: 2023-06-05
Payer: COMMERCIAL

## 2023-06-05 VITALS — SYSTOLIC BLOOD PRESSURE: 128 MMHG | WEIGHT: 188.6 LBS | BODY MASS INDEX: 32.88 KG/M2 | DIASTOLIC BLOOD PRESSURE: 88 MMHG

## 2023-06-05 DIAGNOSIS — O09.523 AMA (ADVANCED MATERNAL AGE) MULTIGRAVIDA 35+, THIRD TRIMESTER: ICD-10-CM

## 2023-06-05 DIAGNOSIS — O41.03X0 OLIGOHYDRAMNIOS IN THIRD TRIMESTER, SINGLE OR UNSPECIFIED FETUS: Primary | ICD-10-CM

## 2023-06-05 DIAGNOSIS — O41.03X0 OLIGOHYDRAMNIOS IN THIRD TRIMESTER, SINGLE OR UNSPECIFIED FETUS: ICD-10-CM

## 2023-06-05 DIAGNOSIS — O09.523 MULTIGRAVIDA OF ADVANCED MATERNAL AGE IN THIRD TRIMESTER: Primary | ICD-10-CM

## 2023-06-05 DIAGNOSIS — O36.5939 SGA (SMALL FOR GESTATIONAL AGE), FETAL, AFFECTING CARE OF MOTHER, ANTEPARTUM, THIRD TRIMESTER, OTHER FETUS: ICD-10-CM

## 2023-06-05 DIAGNOSIS — Z3A.39 39 WEEKS GESTATION OF PREGNANCY: Primary | ICD-10-CM

## 2023-06-05 PROBLEM — O35.BXX0 COARCTATION OF AORTA, FETAL, AFFECTING CARE OF MOTHER: Status: RESOLVED | Noted: 2021-09-16 | Resolved: 2023-06-05

## 2023-06-05 PROBLEM — O36.5930 POOR FETAL GROWTH AFFECTING MANAGEMENT OF MOTHER IN THIRD TRIMESTER: Status: RESOLVED | Noted: 2021-10-06 | Resolved: 2023-06-05

## 2023-06-05 PROBLEM — D58.2 ABNORMAL HEMOGLOBIN: Status: RESOLVED | Noted: 2021-10-21 | Resolved: 2023-06-05

## 2023-06-05 LAB
ABO GROUP BLD: NORMAL
BASOPHILS # BLD AUTO: 0.05 10*3/MM3 (ref 0–0.2)
BASOPHILS NFR BLD AUTO: 0.5 % (ref 0–1.5)
BLD GP AB SCN SERPL QL: NEGATIVE
DEPRECATED RDW RBC AUTO: 44.9 FL (ref 37–54)
EOSINOPHIL # BLD AUTO: 0.07 10*3/MM3 (ref 0–0.4)
EOSINOPHIL NFR BLD AUTO: 0.7 % (ref 0.3–6.2)
ERYTHROCYTE [DISTWIDTH] IN BLOOD BY AUTOMATED COUNT: 16.1 % (ref 12.3–15.4)
GLUCOSE UR STRIP-MCNC: NEGATIVE MG/DL
HCT VFR BLD AUTO: 35.9 % (ref 34–46.6)
HGB BLD-MCNC: 11.9 G/DL (ref 12–15.9)
IMM GRANULOCYTES # BLD AUTO: 0.08 10*3/MM3 (ref 0–0.05)
IMM GRANULOCYTES NFR BLD AUTO: 0.8 % (ref 0–0.5)
LYMPHOCYTES # BLD AUTO: 2.65 10*3/MM3 (ref 0.7–3.1)
LYMPHOCYTES NFR BLD AUTO: 26.6 % (ref 19.6–45.3)
MCH RBC QN AUTO: 25.8 PG (ref 26.6–33)
MCHC RBC AUTO-ENTMCNC: 33.1 G/DL (ref 31.5–35.7)
MCV RBC AUTO: 77.9 FL (ref 79–97)
MONOCYTES # BLD AUTO: 0.58 10*3/MM3 (ref 0.1–0.9)
MONOCYTES NFR BLD AUTO: 5.8 % (ref 5–12)
NEUTROPHILS NFR BLD AUTO: 6.52 10*3/MM3 (ref 1.7–7)
NEUTROPHILS NFR BLD AUTO: 65.6 % (ref 42.7–76)
NRBC BLD AUTO-RTO: 0.1 /100 WBC (ref 0–0.2)
PLATELET # BLD AUTO: 262 10*3/MM3 (ref 140–450)
PMV BLD AUTO: 10.2 FL (ref 6–12)
PROT UR STRIP-MCNC: NEGATIVE MG/DL
RBC # BLD AUTO: 4.61 10*6/MM3 (ref 3.77–5.28)
RH BLD: NEGATIVE
T&S EXPIRATION DATE: NORMAL
WBC NRBC COR # BLD: 9.95 10*3/MM3 (ref 3.4–10.8)

## 2023-06-05 PROCEDURE — 59025 FETAL NON-STRESS TEST: CPT | Performed by: OBSTETRICS & GYNECOLOGY

## 2023-06-05 PROCEDURE — 86900 BLOOD TYPING SEROLOGIC ABO: CPT | Performed by: STUDENT IN AN ORGANIZED HEALTH CARE EDUCATION/TRAINING PROGRAM

## 2023-06-05 PROCEDURE — 0502F SUBSEQUENT PRENATAL CARE: CPT | Performed by: OBSTETRICS & GYNECOLOGY

## 2023-06-05 PROCEDURE — 85025 COMPLETE CBC W/AUTO DIFF WBC: CPT | Performed by: STUDENT IN AN ORGANIZED HEALTH CARE EDUCATION/TRAINING PROGRAM

## 2023-06-05 PROCEDURE — 86901 BLOOD TYPING SEROLOGIC RH(D): CPT | Performed by: STUDENT IN AN ORGANIZED HEALTH CARE EDUCATION/TRAINING PROGRAM

## 2023-06-05 PROCEDURE — 86850 RBC ANTIBODY SCREEN: CPT | Performed by: STUDENT IN AN ORGANIZED HEALTH CARE EDUCATION/TRAINING PROGRAM

## 2023-06-05 RX ORDER — FAMOTIDINE 10 MG/ML
20 INJECTION, SOLUTION INTRAVENOUS ONCE AS NEEDED
Status: DISCONTINUED | OUTPATIENT
Start: 2023-06-05 | End: 2023-06-06 | Stop reason: HOSPADM

## 2023-06-05 RX ORDER — DIPHENHYDRAMINE HYDROCHLORIDE 50 MG/ML
25 INJECTION INTRAMUSCULAR; INTRAVENOUS NIGHTLY PRN
Status: CANCELLED | OUTPATIENT
Start: 2023-06-05

## 2023-06-05 RX ORDER — ONDANSETRON 2 MG/ML
4 INJECTION INTRAMUSCULAR; INTRAVENOUS EVERY 6 HOURS PRN
Status: CANCELLED | OUTPATIENT
Start: 2023-06-05

## 2023-06-05 RX ORDER — DIPHENHYDRAMINE HCL 25 MG
25 TABLET ORAL NIGHTLY PRN
Status: CANCELLED | OUTPATIENT
Start: 2023-06-05

## 2023-06-05 RX ORDER — OXYTOCIN/0.9 % SODIUM CHLORIDE 30/500 ML
2-20 PLASTIC BAG, INJECTION (ML) INTRAVENOUS
Status: DISCONTINUED | OUTPATIENT
Start: 2023-06-05 | End: 2023-06-06 | Stop reason: HOSPADM

## 2023-06-05 RX ORDER — TERBUTALINE SULFATE 1 MG/ML
0.25 INJECTION, SOLUTION SUBCUTANEOUS AS NEEDED
Status: CANCELLED | OUTPATIENT
Start: 2023-06-05

## 2023-06-05 RX ORDER — EPHEDRINE SULFATE 50 MG/ML
5 INJECTION, SOLUTION INTRAVENOUS
Status: DISCONTINUED | OUTPATIENT
Start: 2023-06-05 | End: 2023-06-06 | Stop reason: HOSPADM

## 2023-06-05 RX ORDER — SODIUM CHLORIDE 0.9 % (FLUSH) 0.9 %
10 SYRINGE (ML) INJECTION AS NEEDED
Status: DISCONTINUED | OUTPATIENT
Start: 2023-06-05 | End: 2023-06-06 | Stop reason: HOSPADM

## 2023-06-05 RX ORDER — IBUPROFEN 200 MG
600 TABLET ORAL EVERY 6 HOURS PRN
Status: CANCELLED | OUTPATIENT
Start: 2023-06-05

## 2023-06-05 RX ORDER — LIDOCAINE HYDROCHLORIDE 10 MG/ML
5 INJECTION, SOLUTION EPIDURAL; INFILTRATION; INTRACAUDAL; PERINEURAL AS NEEDED
Status: DISCONTINUED | OUTPATIENT
Start: 2023-06-05 | End: 2023-06-06 | Stop reason: HOSPADM

## 2023-06-05 RX ORDER — ONDANSETRON 4 MG/1
4 TABLET, FILM COATED ORAL EVERY 6 HOURS PRN
Status: CANCELLED | OUTPATIENT
Start: 2023-06-05

## 2023-06-05 RX ORDER — LIDOCAINE HYDROCHLORIDE 10 MG/ML
5 INJECTION, SOLUTION EPIDURAL; INFILTRATION; INTRACAUDAL; PERINEURAL AS NEEDED
Status: CANCELLED | OUTPATIENT
Start: 2023-06-05

## 2023-06-05 RX ORDER — MISOPROSTOL 100 UG/1
800 TABLET ORAL ONCE AS NEEDED
Status: CANCELLED | OUTPATIENT
Start: 2023-06-05

## 2023-06-05 RX ORDER — ACETAMINOPHEN 325 MG/1
650 TABLET ORAL EVERY 4 HOURS PRN
Status: CANCELLED | OUTPATIENT
Start: 2023-06-05

## 2023-06-05 RX ORDER — ONDANSETRON 4 MG/1
4 TABLET, FILM COATED ORAL EVERY 6 HOURS PRN
Status: DISCONTINUED | OUTPATIENT
Start: 2023-06-05 | End: 2023-06-06 | Stop reason: HOSPADM

## 2023-06-05 RX ORDER — SODIUM CHLORIDE 0.9 % (FLUSH) 0.9 %
10 SYRINGE (ML) INJECTION AS NEEDED
Status: CANCELLED | OUTPATIENT
Start: 2023-06-05

## 2023-06-05 RX ORDER — SODIUM CHLORIDE 0.9 % (FLUSH) 0.9 %
10 SYRINGE (ML) INJECTION EVERY 12 HOURS SCHEDULED
Status: DISCONTINUED | OUTPATIENT
Start: 2023-06-05 | End: 2023-06-06 | Stop reason: HOSPADM

## 2023-06-05 RX ORDER — OXYTOCIN 10 [USP'U]/ML
999 INJECTION, SOLUTION INTRAMUSCULAR; INTRAVENOUS ONCE
Status: CANCELLED | OUTPATIENT
Start: 2023-06-05

## 2023-06-05 RX ORDER — SODIUM CHLORIDE, SODIUM LACTATE, POTASSIUM CHLORIDE, CALCIUM CHLORIDE 600; 310; 30; 20 MG/100ML; MG/100ML; MG/100ML; MG/100ML
125 INJECTION, SOLUTION INTRAVENOUS CONTINUOUS
Status: DISCONTINUED | OUTPATIENT
Start: 2023-06-05 | End: 2023-06-08 | Stop reason: HOSPADM

## 2023-06-05 RX ORDER — FENTANYL CIT 0.2 MG/100ML-ROPIV 0.2%-NACL 0.9% EPIDURAL INJ 2/0.2 MCG/ML-%
10 SOLUTION INJECTION CONTINUOUS
Status: DISCONTINUED | OUTPATIENT
Start: 2023-06-05 | End: 2023-06-06

## 2023-06-05 RX ORDER — ONDANSETRON 2 MG/ML
4 INJECTION INTRAMUSCULAR; INTRAVENOUS ONCE AS NEEDED
Status: DISCONTINUED | OUTPATIENT
Start: 2023-06-05 | End: 2023-06-06 | Stop reason: HOSPADM

## 2023-06-05 RX ORDER — SODIUM CHLORIDE 0.9 % (FLUSH) 0.9 %
10 SYRINGE (ML) INJECTION EVERY 12 HOURS SCHEDULED
Status: CANCELLED | OUTPATIENT
Start: 2023-06-05

## 2023-06-05 RX ORDER — TERBUTALINE SULFATE 1 MG/ML
0.25 INJECTION, SOLUTION SUBCUTANEOUS AS NEEDED
Status: DISCONTINUED | OUTPATIENT
Start: 2023-06-05 | End: 2023-06-06 | Stop reason: HOSPADM

## 2023-06-05 RX ORDER — DIPHENHYDRAMINE HYDROCHLORIDE 50 MG/ML
25 INJECTION INTRAMUSCULAR; INTRAVENOUS NIGHTLY PRN
Status: DISCONTINUED | OUTPATIENT
Start: 2023-06-05 | End: 2023-06-06 | Stop reason: HOSPADM

## 2023-06-05 RX ORDER — DIPHENHYDRAMINE HYDROCHLORIDE 50 MG/ML
12.5 INJECTION INTRAMUSCULAR; INTRAVENOUS EVERY 8 HOURS PRN
Status: DISCONTINUED | OUTPATIENT
Start: 2023-06-05 | End: 2023-06-06 | Stop reason: HOSPADM

## 2023-06-05 RX ORDER — ACETAMINOPHEN 325 MG/1
650 TABLET ORAL EVERY 4 HOURS PRN
Status: DISCONTINUED | OUTPATIENT
Start: 2023-06-05 | End: 2023-06-06 | Stop reason: HOSPADM

## 2023-06-05 RX ORDER — CARBOPROST TROMETHAMINE 250 UG/ML
250 INJECTION, SOLUTION INTRAMUSCULAR
Status: CANCELLED | OUTPATIENT
Start: 2023-06-05

## 2023-06-05 RX ORDER — DIPHENHYDRAMINE HCL 25 MG
25 CAPSULE ORAL NIGHTLY PRN
Status: DISCONTINUED | OUTPATIENT
Start: 2023-06-05 | End: 2023-06-06 | Stop reason: HOSPADM

## 2023-06-05 RX ORDER — ONDANSETRON 2 MG/ML
4 INJECTION INTRAMUSCULAR; INTRAVENOUS EVERY 6 HOURS PRN
Status: DISCONTINUED | OUTPATIENT
Start: 2023-06-05 | End: 2023-06-06 | Stop reason: HOSPADM

## 2023-06-05 RX ORDER — OXYTOCIN 10 [USP'U]/ML
250 INJECTION, SOLUTION INTRAMUSCULAR; INTRAVENOUS CONTINUOUS
Status: CANCELLED | OUTPATIENT
Start: 2023-06-05 | End: 2023-06-05

## 2023-06-05 RX ORDER — OXYTOCIN 10 [USP'U]/ML
2-20 INJECTION, SOLUTION INTRAMUSCULAR; INTRAVENOUS
Status: CANCELLED | OUTPATIENT
Start: 2023-06-05

## 2023-06-05 RX ORDER — SODIUM CHLORIDE, SODIUM LACTATE, POTASSIUM CHLORIDE, CALCIUM CHLORIDE 600; 310; 30; 20 MG/100ML; MG/100ML; MG/100ML; MG/100ML
125 INJECTION, SOLUTION INTRAVENOUS CONTINUOUS
Status: CANCELLED | OUTPATIENT
Start: 2023-06-05

## 2023-06-05 RX ORDER — METHYLERGONOVINE MALEATE 0.2 MG/ML
200 INJECTION INTRAVENOUS ONCE AS NEEDED
Status: CANCELLED | OUTPATIENT
Start: 2023-06-05

## 2023-06-05 RX ADMIN — SODIUM CHLORIDE, POTASSIUM CHLORIDE, SODIUM LACTATE AND CALCIUM CHLORIDE 125 ML/HR: 600; 310; 30; 20 INJECTION, SOLUTION INTRAVENOUS at 23:49

## 2023-06-05 RX ADMIN — Medication 2 MILLI-UNITS/MIN: at 20:52

## 2023-06-05 RX ADMIN — SODIUM CHLORIDE, POTASSIUM CHLORIDE, SODIUM LACTATE AND CALCIUM CHLORIDE 125 ML/HR: 600; 310; 30; 20 INJECTION, SOLUTION INTRAVENOUS at 20:00

## 2023-06-05 NOTE — PROGRESS NOTES
Pt feels well, feels movement, a few contractions  See US- BPP 8/8 however MILAN is 4  NST is reactive cat 1- 23 min  Recommend MIL and pt agreeable  Will gather bag at home and present to LD  RBA discussed

## 2023-06-06 PROBLEM — O41.00X0 OLIGOHYDRAMNIOS: Status: ACTIVE | Noted: 2023-06-06

## 2023-06-06 PROBLEM — O36.5931 IUGR (INTRAUTERINE GROWTH RESTRICTION) AFFECTING CARE OF MOTHER, THIRD TRIMESTER, FETUS 1: Status: ACTIVE | Noted: 2023-06-06

## 2023-06-06 LAB
ATMOSPHERIC PRESS: 747.5 MMHG
BASE EXCESS BLDCOA CALC-SCNC: -2.8 MMOL/L (ref -2–2)
BDY SITE: ABNORMAL
HCO3 BLDCOA-SCNC: 25.3 MMOL/L (ref 22–28)
MODALITY: ABNORMAL
NOTE: ABNORMAL
PCO2 BLDCOA: 55.1 MMHG (ref 43–63)
PH BLDCOA: 7.27 PH UNITS (ref 7.18–7.34)
PO2 BLDCOA: <21.2 MMHG (ref 12–26)
SAO2 % BLDCOA: 4 % (ref 92–99)

## 2023-06-06 PROCEDURE — 82803 BLOOD GASES ANY COMBINATION: CPT

## 2023-06-06 PROCEDURE — 25010000002 FENTANYL CITRATE (PF) 50 MCG/ML SOLUTION: Performed by: OBSTETRICS & GYNECOLOGY

## 2023-06-06 PROCEDURE — 10907ZC DRAINAGE OF AMNIOTIC FLUID, THERAPEUTIC FROM PRODUCTS OF CONCEPTION, VIA NATURAL OR ARTIFICIAL OPENING: ICD-10-PCS | Performed by: STUDENT IN AN ORGANIZED HEALTH CARE EDUCATION/TRAINING PROGRAM

## 2023-06-06 PROCEDURE — 88307 TISSUE EXAM BY PATHOLOGIST: CPT

## 2023-06-06 PROCEDURE — 3E033VJ INTRODUCTION OF OTHER HORMONE INTO PERIPHERAL VEIN, PERCUTANEOUS APPROACH: ICD-10-PCS | Performed by: STUDENT IN AN ORGANIZED HEALTH CARE EDUCATION/TRAINING PROGRAM

## 2023-06-06 PROCEDURE — 0HQ9XZZ REPAIR PERINEUM SKIN, EXTERNAL APPROACH: ICD-10-PCS | Performed by: OBSTETRICS & GYNECOLOGY

## 2023-06-06 PROCEDURE — 59400 OBSTETRICAL CARE: CPT | Performed by: OBSTETRICS & GYNECOLOGY

## 2023-06-06 RX ORDER — DIPHENHYDRAMINE HCL 25 MG
25 CAPSULE ORAL NIGHTLY PRN
Status: DISCONTINUED | OUTPATIENT
Start: 2023-06-06 | End: 2023-06-06 | Stop reason: HOSPADM

## 2023-06-06 RX ORDER — ONDANSETRON 4 MG/1
4 TABLET, FILM COATED ORAL EVERY 8 HOURS PRN
Status: DISCONTINUED | OUTPATIENT
Start: 2023-06-06 | End: 2023-06-08 | Stop reason: HOSPADM

## 2023-06-06 RX ORDER — IBUPROFEN 600 MG/1
600 TABLET ORAL EVERY 6 HOURS PRN
Status: DISCONTINUED | OUTPATIENT
Start: 2023-06-06 | End: 2023-06-06 | Stop reason: HOSPADM

## 2023-06-06 RX ORDER — OXYTOCIN/0.9 % SODIUM CHLORIDE 30/500 ML
250 PLASTIC BAG, INJECTION (ML) INTRAVENOUS CONTINUOUS
Status: ACTIVE | OUTPATIENT
Start: 2023-06-06 | End: 2023-06-06

## 2023-06-06 RX ORDER — IBUPROFEN 600 MG/1
600 TABLET ORAL EVERY 6 HOURS PRN
Status: DISCONTINUED | OUTPATIENT
Start: 2023-06-06 | End: 2023-06-08 | Stop reason: HOSPADM

## 2023-06-06 RX ORDER — CARBOPROST TROMETHAMINE 250 UG/ML
250 INJECTION, SOLUTION INTRAMUSCULAR
Status: DISCONTINUED | OUTPATIENT
Start: 2023-06-06 | End: 2023-06-06 | Stop reason: HOSPADM

## 2023-06-06 RX ORDER — DIPHENHYDRAMINE HYDROCHLORIDE 50 MG/ML
25 INJECTION INTRAMUSCULAR; INTRAVENOUS NIGHTLY PRN
Status: DISCONTINUED | OUTPATIENT
Start: 2023-06-06 | End: 2023-06-06 | Stop reason: HOSPADM

## 2023-06-06 RX ORDER — BISACODYL 10 MG
10 SUPPOSITORY, RECTAL RECTAL DAILY PRN
Status: DISCONTINUED | OUTPATIENT
Start: 2023-06-07 | End: 2023-06-08 | Stop reason: HOSPADM

## 2023-06-06 RX ORDER — ONDANSETRON 4 MG/1
4 TABLET, FILM COATED ORAL EVERY 6 HOURS PRN
Status: DISCONTINUED | OUTPATIENT
Start: 2023-06-06 | End: 2023-06-06 | Stop reason: HOSPADM

## 2023-06-06 RX ORDER — HYDROCODONE BITARTRATE AND ACETAMINOPHEN 5; 325 MG/1; MG/1
1 TABLET ORAL EVERY 4 HOURS PRN
Status: DISCONTINUED | OUTPATIENT
Start: 2023-06-06 | End: 2023-06-08 | Stop reason: HOSPADM

## 2023-06-06 RX ORDER — HYDROCODONE BITARTRATE AND ACETAMINOPHEN 10; 325 MG/1; MG/1
1 TABLET ORAL EVERY 4 HOURS PRN
Status: DISCONTINUED | OUTPATIENT
Start: 2023-06-06 | End: 2023-06-08 | Stop reason: HOSPADM

## 2023-06-06 RX ORDER — PROMETHAZINE HYDROCHLORIDE 12.5 MG/1
12.5 TABLET ORAL EVERY 4 HOURS PRN
Status: DISCONTINUED | OUTPATIENT
Start: 2023-06-06 | End: 2023-06-08 | Stop reason: HOSPADM

## 2023-06-06 RX ORDER — METHYLERGONOVINE MALEATE 0.2 MG/ML
200 INJECTION INTRAVENOUS ONCE AS NEEDED
Status: DISCONTINUED | OUTPATIENT
Start: 2023-06-06 | End: 2023-06-06 | Stop reason: HOSPADM

## 2023-06-06 RX ORDER — ACETAMINOPHEN 325 MG/1
650 TABLET ORAL EVERY 6 HOURS PRN
Status: DISCONTINUED | OUTPATIENT
Start: 2023-06-06 | End: 2023-06-08 | Stop reason: HOSPADM

## 2023-06-06 RX ORDER — DOCUSATE SODIUM 100 MG/1
100 CAPSULE, LIQUID FILLED ORAL 2 TIMES DAILY
Status: DISCONTINUED | OUTPATIENT
Start: 2023-06-06 | End: 2023-06-08 | Stop reason: HOSPADM

## 2023-06-06 RX ORDER — ONDANSETRON 2 MG/ML
4 INJECTION INTRAMUSCULAR; INTRAVENOUS EVERY 6 HOURS PRN
Status: DISCONTINUED | OUTPATIENT
Start: 2023-06-06 | End: 2023-06-06 | Stop reason: HOSPADM

## 2023-06-06 RX ORDER — OXYTOCIN/0.9 % SODIUM CHLORIDE 30/500 ML
125 PLASTIC BAG, INJECTION (ML) INTRAVENOUS CONTINUOUS PRN
Status: DISCONTINUED | OUTPATIENT
Start: 2023-06-06 | End: 2023-06-08 | Stop reason: HOSPADM

## 2023-06-06 RX ORDER — PHYTONADIONE 1 MG/.5ML
INJECTION, EMULSION INTRAMUSCULAR; INTRAVENOUS; SUBCUTANEOUS
Status: ACTIVE
Start: 2023-06-06 | End: 2023-06-06

## 2023-06-06 RX ORDER — FENTANYL CITRATE 50 UG/ML
50 INJECTION, SOLUTION INTRAMUSCULAR; INTRAVENOUS
Status: DISCONTINUED | OUTPATIENT
Start: 2023-06-06 | End: 2023-06-06 | Stop reason: HOSPADM

## 2023-06-06 RX ORDER — CALCIUM CARBONATE 500 MG/1
2 TABLET, CHEWABLE ORAL 3 TIMES DAILY PRN
Status: DISCONTINUED | OUTPATIENT
Start: 2023-06-06 | End: 2023-06-08 | Stop reason: HOSPADM

## 2023-06-06 RX ORDER — MISOPROSTOL 200 UG/1
800 TABLET ORAL ONCE AS NEEDED
Status: DISCONTINUED | OUTPATIENT
Start: 2023-06-06 | End: 2023-06-06 | Stop reason: HOSPADM

## 2023-06-06 RX ORDER — HYDROCORTISONE 25 MG/G
CREAM TOPICAL AS NEEDED
Status: DISCONTINUED | OUTPATIENT
Start: 2023-06-06 | End: 2023-06-08 | Stop reason: HOSPADM

## 2023-06-06 RX ORDER — OXYTOCIN/0.9 % SODIUM CHLORIDE 30/500 ML
999 PLASTIC BAG, INJECTION (ML) INTRAVENOUS ONCE
Status: COMPLETED | OUTPATIENT
Start: 2023-06-06 | End: 2023-06-06

## 2023-06-06 RX ORDER — ERYTHROMYCIN 5 MG/G
OINTMENT OPHTHALMIC
Status: ACTIVE
Start: 2023-06-06 | End: 2023-06-06

## 2023-06-06 RX ADMIN — Medication 999 ML/HR: at 11:53

## 2023-06-06 RX ADMIN — FENTANYL CITRATE 50 MCG: 50 INJECTION, SOLUTION INTRAMUSCULAR; INTRAVENOUS at 09:01

## 2023-06-06 RX ADMIN — SODIUM CHLORIDE, POTASSIUM CHLORIDE, SODIUM LACTATE AND CALCIUM CHLORIDE 125 ML/HR: 600; 310; 30; 20 INJECTION, SOLUTION INTRAVENOUS at 07:28

## 2023-06-06 NOTE — LACTATION NOTE
This note was copied from a baby's chart.  P2 term SGA baby, 3hrs old with low BGM, has received glucose gel x2 and breast fed x5 minutes just now per RN. Baby sleepy now, barely suckles on gloved finger. Mom hand expressed a few drops of milk into her mouth then supplemented with 15mls of Neosure. Mom wants to do STS now. She reports she breast fed her first baby x 19 months. HGP brought to room with supplies. Mom will call when ready to pump. Discussed triple feeding every 3hrs.  She has [employer] personal pump at home.

## 2023-06-06 NOTE — H&P
[facility]   Obstetrics and Gynecology   History & Physical    2023    Patient: Gustabo Jacome          MR#:[id number]    Chief complaint:  oligohydramnios    Subjective     38 y.o. female  at 40w0d presents with oligohydramnios and IUGR at term.  Endorses regular fetal movements.  She has received Pitocin overnight and reports regular, painful contractions.    Pregnancy has been complicated by AMA status.  She also has a history of LEEP, sickle cell trait, and RhoGAM.  Patient reports that the father of the baby is both Rh- and sickle cell trait negative.  She declined RhoGAM vaccine.  Patient has a history of herpes and has been compliant with Valtrex suppression.      Patient Active Problem List   Diagnosis    Blood type, Rh negative    History of cervical dysplasia    History of loop electrosurgical excision procedure (LEEP) of cervix affecting pregnancy, antepartum    Herpes simplex vulvovaginitis    AMA (advanced maternal age) multigravida 35+    Sickle cell trait    Pregnancy    Maternal care for poor fetal growth in third trimester    Pregnant    IUGR (intrauterine growth restriction) affecting care of mother, third trimester, fetus 1       Past Medical History:   Diagnosis Date    Abnormal Pap smear of cervix     Herpes     no outbreak currently     Sickle cell trait        Past Surgical History:   Procedure Laterality Date    CERVICAL BIOPSY  W/ LOOP ELECTRODE EXCISION      D & C HYSTEROSCOPY N/A 9/3/2020    Procedure: HYSTEROSCOPY,POLYPECTOMY with myosure;  Surgeon: Cari Ovalle MD;  [place] [facility] [place];  Service: Gynecology;  Laterality: N/A;    MANDIBLE FRACTURE SURGERY      MANDIBLE SURGERY      WISDOM TOOTH EXTRACTION         Obstetric History:  OB History          3    Para   1    Term   1            AB   1    Living   1         SAB   1    IAB        Ectopic        Molar        Multiple   0    Live Births   1               Menstrual History:      Patient's last menstrual period was 2022.       # 1 - Date: 2016, Sex: None, Weight: None, GA: None, Delivery: Spontaneous , Apgar1: None, Apgar5: None, Living: None, Birth Comments: None    # 2 - Date: 10/10/21, Sex: Female, Weight: 2676 g (5 lb 14.4 oz), GA: 37w6d, Delivery: Vaginal, Spontaneous, Apgar1: 9, Apgar5: 9, Living: Living, Birth Comments: scale 4    # 3 - Date: None, Sex: None, Weight: None, GA: None, Delivery: None, Apgar1: None, Apgar5: None, Living: None, Birth Comments: None      Prenatal Information:  Prenatal Results       Initial Prenatal Labs       Test Value Reference Range Date Time    Hemoglobin  12.6 g/dL 11.1 - 15.9 10/17/22 1559    Hematocrit  39.3 % 34.0 - 46.6 10/17/22 155    Platelets  377 x10E3/uL 150 - 450 10/17/22 1559    Rubella IgG  6.43 index Immune >0.99 10/17/22 155    Hepatitis B SAg  Negative  Negative 10/17/22 155    Hepatitis C Ab  <0.1 s/co ratio 0.0 - 0.9 10/17/22 155    RPR  Non Reactive  Non Reactive 10/17/22 155    T. Pallidum Ab         ABO  A   23    Rh  Negative   23    Antibody Screen  Negative  Negative 10/17/22 1559    HIV  Non Reactive  Non Reactive 10/17/22 155    Urine Culture  Final report   10/17/22 1559    Gonorrhea  Negative  Negative 22 1415       Negative  Negative 10/17/22 1547    Chlamydia  Negative  Negative 22 1415       Negative  Negative 10/17/22 1547    TSH  1.030 uIU/mL 0.270 - 4.200 21 0857    HgB A1c         Varicella IgG        HgB Electrophoresis         Cystic fibrosis                   Fetal testing        Test Value Reference Range Date Time    NIPT        MSAFP        AFP-4                  2nd and 3rd Trimester       Test Value Reference Range Date Time    Hemoglobin (repeated)  11.9 g/dL 12.0 - 15.9 23 195       11.9 g/dL 11.1 - 15.9 23 1230    Hematocrit (repeated)  35.9 % 34.0 - 46.6 23       36.7 % 34.0 - 46.6 23 1230    Platelets   262  10*3/mm3 140 - 450 06/05/23 1959       290 x10E3/uL 150 - 450 03/06/23 1230       377 x10E3/uL 150 - 450 10/17/22 1559    GCT  82 mg/dL 70 - 139 03/06/23 1230    Antibody Screen (repeated)  Negative   06/05/23 1959       Negative  Negative 10/17/22 1559    GTT Fasting        GTT 1 Hr        GTT 2 Hr        GTT 3 Hr        Group B Strep  Negative  Negative 05/08/23 1606              Other testing        Test Value Reference Range Date Time    Parvo IgG         CMV IgG                   Drug Screening       Test Value Reference Range Date Time    Amphetamine Screen        Barbiturate Screen        Benzodiazepine Screen        Methadone Screen        Phencyclidine Screen        Opiates Screen        THC Screen        Cocaine Screen        Propoxyphene Screen        Buprenorphine Screen        Methamphetamine Screen        Oxycodone Screen        Tricyclic Antidepressants Screen                  Legend    ^: Historical                          External Prenatal Results       Pregnancy Outside Results - Transcribed From Office Records - See Scanned Records For Details       Test Value Date Time    ABO  A  06/05/23 1959    Rh  Negative  06/05/23 1959    Antibody Screen  Negative  06/05/23 1959       Negative  10/17/22 1559    Varicella IgG ^ positive history  04/12/21     Rubella  6.43 index 10/17/22 1559    Hgb  11.9 g/dL 06/05/23 1959       11.9 g/dL 03/06/23 1230       12.6 g/dL 10/17/22 1559    Hct  35.9 % 06/05/23 1959       36.7 % 03/06/23 1230       39.3 % 10/17/22 1559    Glucose Fasting GTT       Glucose Tolerance Test 1 hour       Glucose Tolerance Test 3 hour       Gonorrhea (discrete)  Negative  12/19/22 1415       Negative  10/17/22 1547    Chlamydia (discrete)  Negative  12/19/22 1415       Negative  10/17/22 1547    RPR  Non Reactive  10/17/22 1559    VDRL       Syphilis Antibody       HBsAg  Negative  10/17/22 1559    Herpes Simplex Virus PCR       Herpes Simplex VIrus Culture       HIV  Non Reactive   10/17/22 1559    Hep C RNA Quant PCR       Hep C Antibody  <0.1 s/co ratio 10/17/22 1559    AFP       Group B Strep  Negative  05/08/23 1606    GBS Susceptibility to Clindamycin       GBS Susceptibility to Erythromycin       Fetal Fibronectin       Genetic Testing, Maternal Blood                 Drug Screening       Test Value Date Time    Urine Drug Screen       Amphetamine Screen       Barbiturate Screen       Benzodiazepine Screen       Methadone Screen       Phencyclidine Screen       Opiates Screen       THC Screen       Cocaine Screen       Propoxyphene Screen       Buprenorphine Screen       Methamphetamine Screen       Oxycodone Screen       Tricyclic Antidepressants Screen                 Legend    ^: Historical                              Family History   Problem Relation Age of Onset    No Known Problems Mother     Hypertension Father     Hypertension Brother     Cervical cancer Maternal Aunt     Ovarian cancer Paternal Aunt     No Known Problems Maternal Grandmother     No Known Problems Maternal Grandfather     Dementia Paternal Grandmother     Malig Hyperthermia Neg Hx        Social History     Tobacco Use    Smoking status: Never    Smokeless tobacco: Never   Vaping Use    Vaping Use: Never used   Substance Use Topics    Alcohol use: No    Drug use: No       Patient has no known allergies.      Current Facility-Administered Medications:     acetaminophen (TYLENOL) tablet 650 mg, 650 mg, Oral, Q4H PRN, Amberly Jackson MD    diphenhydrAMINE (BENADRYL) capsule 25 mg, 25 mg, Oral, Nightly PRN **OR** diphenhydrAMINE (BENADRYL) injection 25 mg, 25 mg, Intravenous, Nightly PRN, Amberly Jackson MD    diphenhydrAMINE (BENADRYL) injection 12.5 mg, 12.5 mg, Intravenous, Q8H PRN, Douglas Ward MD    ePHEDrine injection 5 mg, 5 mg, Intravenous, Q15 Min PRN, Douglas Ward MD    famotidine (PEPCID) injection 20 mg, 20 mg, Intravenous, Once PRN, Douglas Ward MD    fentaNYL 2mcg/mL and  ropivacaine 0.2% in NS epidural 100mL, 10 mL/hr, Epidural, Continuous, Douglas Ward MD    lactated ringers bolus 1,000 mL, 1,000 mL, Intravenous, Once, Amberly Jackson MD    lactated ringers infusion, 125 mL/hr, Intravenous, Continuous, Amberly Jackson MD, Last Rate: 125 mL/hr at 06/05/23 2349, 125 mL/hr at 06/05/23 2349    lidocaine PF 1% (XYLOCAINE) injection 5 mL, 5 mL, Intradermal, PRN, Amberly Jackson MD    ondansetron (ZOFRAN) tablet 4 mg, 4 mg, Oral, Q6H PRN **OR** ondansetron (ZOFRAN) injection 4 mg, 4 mg, Intravenous, Q6H PRN, Amberly Jackson MD    ondansetron (ZOFRAN) injection 4 mg, 4 mg, Intravenous, Once PRN, Douglas Ward MD    oxytocin (PITOCIN) 30 units in 0.9% sodium chloride 500 mL (premix), 2-20 martin-units/min, Intravenous, Titrated, Amberly Jackson MD, Last Rate: 10 mL/hr at 06/06/23 0554, 10 martin-units/min at 06/06/23 0554    sodium chloride 0.9 % flush 10 mL, 10 mL, Intravenous, Q12H, Amberly Jackson MD    sodium chloride 0.9 % flush 10 mL, 10 mL, Intravenous, PRN, Amberly Jackson MD    terbutaline (BRETHINE) injection 0.25 mg, 0.25 mg, Subcutaneous, PRN, Amberly Jackson MD    Review of Systems  Review of Systems   All other systems reviewed and are negative.    Objective     Vital Signs  Temp:  [97.4 °F (36.3 °C)-98.4 °F (36.9 °C)] 98.4 °F (36.9 °C)  Heart Rate:  [60-74] 60  Resp:  [15-16] 15  BP: (114-138)/(65-89) 120/71    Physical Exam:  Physical Exam  Vitals and nursing note reviewed.   Constitutional:       General: She is not in acute distress.     Appearance: Normal appearance.   HENT:      Head: Normocephalic and atraumatic.   Eyes:      Extraocular Movements: Extraocular movements intact.   Cardiovascular:      Rate and Rhythm: Normal rate.   Pulmonary:      Effort: Pulmonary effort is normal. No respiratory distress.   Abdominal:      General: There is no distension.      Palpations: Abdomen is soft. There is no mass.       Tenderness: There is no abdominal tenderness.   Musculoskeletal:         General: Normal range of motion.      Cervical back: Normal range of motion.   Skin:     General: Skin is warm and dry.   Neurological:      General: No focal deficit present.      Mental Status: She is alert and oriented to person, place, and time.   Psychiatric:         Mood and Affect: Mood normal.         Behavior: Behavior normal.         Hospital problem list:    Blood type, Rh negative    History of cervical dysplasia    History of loop electrosurgical excision procedure (LEEP) of cervix affecting pregnancy, antepartum    Herpes simplex vulvovaginitis    AMA (advanced maternal age) multigravida 35+    Sickle cell trait    Pregnancy    Maternal care for poor fetal growth in third trimester    IUGR (intrauterine growth restriction) affecting care of mother, third trimester, fetus 1      Assessment & Plan     1. Intrauterine pregnancy at 40w0d with IUGR and oligohydramnios  -Admit for induction of labor  -Reviewed procedure with patient.  I discussed the risks including but not limited to bleeding, infection and damage to internal organs.  Understanding of the procedure is voiced.   -S/p AROM, continue Pitocin as tolerated    2. Rh negative - FOB reported Rh neg, declined rhogam  3. Sickle cell trait - FOB reported SCT neg  4. AMA  5. HSV - no lesions ntoed  6. H/o LEEP    Dispo: admit    Amberly Jackson MD  06/06/23  06:23 EDT      Patient Care Team:  Provider, No Known as PCP - Gayathri Oliver MD as Obstetrician (Obstetrics and Gynecology)

## 2023-06-06 NOTE — L&D DELIVERY NOTE
[facility]   Vaginal Delivery Note    Patient Name: Gustabo Jacome  : 1985  MRN: [id number]    Date of Delivery: 2023     Diagnosis     Pre & Post-Delivery:  Intrauterine pregnancy at 40w0d  Labor status: Induced Onset of Labor     Oligohydramnios    Blood type, Rh negative    History of cervical dysplasia    History of loop electrosurgical excision procedure (LEEP) of cervix affecting pregnancy, antepartum    Herpes simplex vulvovaginitis    AMA (advanced maternal age) multigravida 35+    Sickle cell trait    Pregnancy    Maternal care for poor fetal growth in third trimester    IUGR (intrauterine growth restriction) affecting care of mother, third trimester, fetus 1             Problem List    Transfer to Postpartum     Review the Delivery Report for details.     Delivery     Delivery: Vaginal, Spontaneous     YOB: 2023    Time of Birth:  Gestational Age 11:53 AM   40w0d     Anesthesia: None     Delivering clinician: Gayathri Knight    Forceps?   No   Vacuum? No    Shoulder dystocia present: No        Delivery narrative:    The patient was admitted for induction due to SGA and oligohydramnios.  She underwent pitocin induction with amniotomy at 2 cm with clear fluid. Her GBS status was negative. The FHTs were reassuring throughout the labor and delivery course.  She progressed along a normal labor curve to completely dilated.  The patient pushed for one contraction a spontaneous vaginal delivery. The infant was bulb suctioned on the perineum and again after delivery.  The infant had good tone and cry.  The infant was placed on mothers abdomen. Gases were sent due to very small size of baby. Cord blood was obtained. The placenta did follow spontaneously. Placenta will be sent to pathology as IUGR suspected. The uterus was not explored due to natural childbirth. A first degree laceration was repaired in the usual fashion.  The patient tolerated the procedure well.  Mother and infant  are recovering well at this time    QBL:  Quantitative Blood Loss (mL): 85 mL       Infant     Findings: female  infant     Infant observations: Weight: 2155 g (4 lb 12 oz)   Length: 17.5  in  Observations/Comments:  scale 2      Apgars: 8  @ 1 minute /    9  @ 5 minutes   Infant Name: Erica     Placenta & Cord         Placenta delivered  Spontaneous  at   6/6/2023 11:57 AM     Cord: 3 vessels  present.   Nuchal Cord?  no   Cord blood obtained: Yes    Cord gases obtained:  Yes    Cord gas results: Venous:  No results found for: PHCVEN    Arterial:    pH, Cord Arterial   Date Value Ref Range Status   06/06/2023 7.27 7.18 - 7.34 pH Units Final        Repair     Episiotomy: None     No    Lacerations: Yes  Laceration Information  Laceration Repaired?   Perineal: 1st  Yes    Periurethral:       Labial:       Sulcus:       Vaginal:       Cervical:         Suture used for repair: 3-0 Vicryl   Estimated Blood Loss: Est. Blood Loss (mL): 85 mL (Filed from Delivery Summary) (06/06/23 1153)100 cc     Quantitative Blood Loss: Quantitative Blood Loss (mL): 85 mL (06/06/23 1155)        Complications     none    Disposition     Mother to Mother Baby/Postpartum  in stable condition currently.  Baby to remains with mom  in stable condition currently.    Gayathri Knight MD  06/06/23  15:16 EDT

## 2023-06-06 NOTE — LACTATION NOTE
This note was copied from a baby's chart.  Mom used the HGP on her own and got 20 ml of EBM. Asked for help washing the parts. Instructions and demonstration given. Dad reports baby is sleepy and they were able to give her only 3ml of colostrum via bottle. Assisted parents with feeding baby and she took 12 more ml of EBM. Encouraged to call LC if needing further assistance.

## 2023-06-06 NOTE — PROGRESS NOTES
[facility]  Gustabo Jacome  : 1985  MRN: [id number]  CSN: [id number]    Labor progress note    Subjective     She is feeling painful contraction.  AROM requesting by covering physician.     Objective   Min/max vitals past 24 hours:  Temp  Min: 97.4 °F (36.3 °C)  Max: 98 °F (36.7 °C)   BP  Min: 114/65  Max: 138/89   Pulse  Min: 61  Max: 74   Resp  Min: 15  Max: 16        FHT's: reassuring and category 1   Cervix: was checked (by me): 3 cm / 70 % / -3   Contractions: every 3 minutes     AROM with a only a few drops of clear fluid.     Assessment   IUP at 40w0d  Oligohydramnios  Fetal status reassuring     Plan   Continue with induction    Margot Tejeda MD  2023  05:38 EDT

## 2023-06-06 NOTE — PROGRESS NOTES
"INTRAPARTUM EVALUATION    Subjective:    Pain is not controlled.    Objective:    /63 (BP Location: Left arm, Patient Position: Lying)   Pulse 62   Temp 98 °F (36.7 °C) (Oral)   Resp 16   Ht 161.3 cm (63.5\")   Wt 86.9 kg (191 lb 9.6 oz)   LMP 2022   SpO2 98%   Breastfeeding Yes   BMI 33.41 kg/m²     Cervix is 3cm/90%/-2    FHR tracing description: category 1    [place]: irregular contractions    GBS status: negative    Assessment and Plan:    Patient Active Problem List   Diagnosis    Blood type, Rh negative    History of cervical dysplasia    History of loop electrosurgical excision procedure (LEEP) of cervix affecting pregnancy, antepartum    Herpes simplex vulvovaginitis    AMA (advanced maternal age) multigravida 35+    Sickle cell trait    Pregnancy    Maternal care for poor fetal growth in third trimester    Pregnant    IUGR (intrauterine growth restriction) affecting care of mother, third trimester, fetus 1    Oligohydramnios       Labor Plan: continue pitocin, AROM this am    Fetal Plan: reassuring tracing    GBS treatment: N/A    Other plan: anticipate - pt entering active phase, plans NCB   "

## 2023-06-06 NOTE — PLAN OF CARE
Goal Outcome Evaluation:              Outcome Evaluation: PT IOL WITH PITOCIN. OLIGOHYDRAMINOS. PT WANTS TO HAVE A NATURAL CHILDBIRTH. WISHES FOR NITROUS WHEN NEEDED. PT EDUCATED ON DIFFERENT LABORING POSITIONS. PEANUT AND BIRTHING BALLS PROVIDED. WHILE ON PITOCIN, BABY STARTED HAVING RECURRENT LATES. INTRAUTERINE RESUSITATION DONE, PITOCIN WAS TURNED OFF, AND A FLUID BOLUS WAS INITIATED. BABY RECOVERED WELL AND PITOCIN WAS RESTARTED.

## 2023-06-07 LAB
BASOPHILS # BLD AUTO: 0.05 10*3/MM3 (ref 0–0.2)
BASOPHILS NFR BLD AUTO: 0.3 % (ref 0–1.5)
DEPRECATED RDW RBC AUTO: 46.6 FL (ref 37–54)
EOSINOPHIL # BLD AUTO: 0.08 10*3/MM3 (ref 0–0.4)
EOSINOPHIL NFR BLD AUTO: 0.5 % (ref 0.3–6.2)
ERYTHROCYTE [DISTWIDTH] IN BLOOD BY AUTOMATED COUNT: 16.5 % (ref 12.3–15.4)
HCT VFR BLD AUTO: 34.1 % (ref 34–46.6)
HGB BLD-MCNC: 11.4 G/DL (ref 12–15.9)
IMM GRANULOCYTES # BLD AUTO: 0.11 10*3/MM3 (ref 0–0.05)
IMM GRANULOCYTES NFR BLD AUTO: 0.7 % (ref 0–0.5)
LYMPHOCYTES # BLD AUTO: 2.72 10*3/MM3 (ref 0.7–3.1)
LYMPHOCYTES NFR BLD AUTO: 16.8 % (ref 19.6–45.3)
MCH RBC QN AUTO: 26.3 PG (ref 26.6–33)
MCHC RBC AUTO-ENTMCNC: 33.4 G/DL (ref 31.5–35.7)
MCV RBC AUTO: 78.6 FL (ref 79–97)
MONOCYTES # BLD AUTO: 0.9 10*3/MM3 (ref 0.1–0.9)
MONOCYTES NFR BLD AUTO: 5.5 % (ref 5–12)
NEUTROPHILS NFR BLD AUTO: 12.37 10*3/MM3 (ref 1.7–7)
NEUTROPHILS NFR BLD AUTO: 76.2 % (ref 42.7–76)
NRBC BLD AUTO-RTO: 0 /100 WBC (ref 0–0.2)
PLATELET # BLD AUTO: 243 10*3/MM3 (ref 140–450)
PMV BLD AUTO: 9.4 FL (ref 6–12)
RBC # BLD AUTO: 4.34 10*6/MM3 (ref 3.77–5.28)
WBC NRBC COR # BLD: 16.23 10*3/MM3 (ref 3.4–10.8)

## 2023-06-07 PROCEDURE — 85025 COMPLETE CBC W/AUTO DIFF WBC: CPT | Performed by: OBSTETRICS & GYNECOLOGY

## 2023-06-07 RX ADMIN — IBUPROFEN 600 MG: 600 TABLET, FILM COATED ORAL at 10:34

## 2023-06-07 NOTE — PLAN OF CARE
Problem: Adult Inpatient Plan of Care  Goal: Plan of Care Review  Outcome: Ongoing, Progressing  Flowsheets (Taken 6/6/2023 2057)  Progress: improving  Plan of Care Reviewed With:   patient   spouse  Outcome Evaluation: vss, assessments wnl, voiding and passing gas, ambulating well, bleeding light, pain well controlled, working on breastfeeding.  Goal: Patient-Specific Goal (Individualized)  Outcome: Ongoing, Progressing  Goal: Absence of Hospital-Acquired Illness or Injury  Outcome: Ongoing, Progressing  Intervention: Identify and Manage Fall Risk  Recent Flowsheet Documentation  Taken 6/6/2023 2045 by Joanna Hsu RN  Safety Promotion/Fall Prevention: safety round/check completed  Intervention: Prevent Skin Injury  Recent Flowsheet Documentation  Taken 6/6/2023 2045 by Joanna Hsu RN  Body Position: position changed independently  Intervention: Prevent and Manage VTE (Venous Thromboembolism) Risk  Recent Flowsheet Documentation  Taken 6/6/2023 2045 by Joanna Hsu RN  Activity Management:   up ad lucas   activity encouraged  Goal: Optimal Comfort and Wellbeing  Outcome: Ongoing, Progressing  Intervention: Provide Person-Centered Care  Recent Flowsheet Documentation  Taken 6/6/2023 2045 by Joanna Hsu RN  Trust Relationship/Rapport:   care explained   choices provided  Goal: Readiness for Transition of Care  Outcome: Ongoing, Progressing     Problem: Bleeding (Labor)  Goal: Hemostasis  Outcome: Ongoing, Progressing     Problem: Change in Fetal Wellbeing (Labor)  Goal: Stable Fetal Wellbeing  Outcome: Ongoing, Progressing  Intervention: Promote and Monitor Fetal Wellbeing  Recent Flowsheet Documentation  Taken 6/6/2023 2045 by Joanna Hsu RN  Body Position: position changed independently     Problem: Delayed Labor Progression (Labor)  Goal: Effective Progression to Delivery  Outcome: Ongoing, Progressing     Problem: Infection (Labor)  Goal: Absence of Infection Signs and Symptoms  Outcome: Ongoing,  Progressing     Problem: Labor Pain (Labor)  Goal: Acceptable Pain Control  Outcome: Ongoing, Progressing     Problem: Uterine Tachysystole (Labor)  Goal: Normal Uterine Contraction Pattern  Outcome: Ongoing, Progressing     Problem:  Fall Injury Risk  Goal: Absence of Fall, Infant Drop and Related Injury  Outcome: Ongoing, Progressing  Intervention: Promote Injury-Free Environment  Recent Flowsheet Documentation  Taken 2023 by Joanna Hsu RN  Safety Promotion/Fall Prevention: safety round/check completed     Problem: Skin Injury Risk Increased  Goal: Skin Health and Integrity  Outcome: Ongoing, Progressing     Problem: Adjustment to Role Transition (Postpartum Vaginal Delivery)  Goal: Successful Maternal Role Transition  Outcome: Ongoing, Progressing     Problem: Bleeding (Postpartum Vaginal Delivery)  Goal: Hemostasis  Outcome: Ongoing, Progressing     Problem: Infection (Postpartum Vaginal Delivery)  Goal: Absence of Infection Signs/Symptoms  Outcome: Ongoing, Progressing  Intervention: Prevent or Manage Infection  Recent Flowsheet Documentation  Taken 2023 by Joanna Hsu RN  Perineal Care:   absorbent brief/pad changed   perineum cleansed   cold pack/ice pack applied     Problem: Pain (Postpartum Vaginal Delivery)  Goal: Acceptable Pain Control  Outcome: Ongoing, Progressing     Problem: Urinary Retention (Postpartum Vaginal Delivery)  Goal: Effective Urinary Elimination  Outcome: Ongoing, Progressing     Problem: Breastfeeding  Goal: Effective Breastfeeding  Outcome: Ongoing, Progressing   Goal Outcome Evaluation:  Plan of Care Reviewed With: patient, spouse        Progress: improving  Outcome Evaluation: vss, assessments wnl, voiding and passing gas, ambulating well, bleeding light, pain well controlled, working on breastfeeding.

## 2023-06-07 NOTE — PROGRESS NOTES
[facility]   Obstetrics and Gynecology     2023    Name:Gustabo Jacome   MR#:[id number]    Vaginal Delivery Progress Note    HD#2    Subjective   Postpartum Day 1: 38 y.o. female  delivered at 40w0d  delivered a female  infant.     The patient feels well.  Her pain is controlled.    She is ambulating well.  Patient describes her bleeding as thin lochia.    Breastfeeding/bottle:  The patient is currently breastfeeding.    Patient Active Problem List   Diagnosis    Blood type, Rh negative    History of cervical dysplasia    History of loop electrosurgical excision procedure (LEEP) of cervix affecting pregnancy, antepartum    Herpes simplex vulvovaginitis    AMA (advanced maternal age) multigravida 35+    Sickle cell trait    Pregnancy    Maternal care for poor fetal growth in third trimester    Pregnant    IUGR (intrauterine growth restriction) affecting care of mother, third trimester, fetus 1    Oligohydramnios       Objective   Vital Signs Range for the last 24 hours  Temp: Temp:  [97.1 °F (36.2 °C)-99.1 °F (37.3 °C)] 98.3 °F (36.8 °C) Temp src: Oral   BP: BP: (111-133)/(65-94) 129/74        Pulse: Heart Rate:  [56-93] 66  RR: Resp:  [16] 16  Weight: 86.9 kg (191 lb 9.6 oz)  BMI:  Body mass index is 33.41 kg/m².    Results from last 7 days   Lab Units 23  0557 23  1959   WBC 10*3/mm3 16.23* 9.95   HEMOGLOBIN g/dL 11.4* 11.9*   HEMATOCRIT % 34.1 35.9   PLATELETS 10*3/mm3 243 262           Physical Exam  Vitals reviewed.   Constitutional:       Appearance: Normal appearance.   Pulmonary:      Effort: Pulmonary effort is normal.   Neurological:      Mental Status: She is alert and oriented to person, place, and time.   Psychiatric:         Mood and Affect: Mood normal.         Behavior: Behavior normal.       Assessment/Plan   1.  PPD# 1    Oligohydramnios    Blood type, Rh negative    History of cervical dysplasia    History of loop electrosurgical excision procedure (LEEP) of  cervix affecting pregnancy, antepartum    Herpes simplex vulvovaginitis    AMA (advanced maternal age) multigravida 35+    Sickle cell trait    Pregnancy    Maternal care for poor fetal growth in third trimester    IUGR (intrauterine growth restriction) affecting care of mother, third trimester, fetus 1      Plan:   Continue routine postpartum care    Deandra Marquez MD  6/7/2023 10:31 EDT

## 2023-06-08 VITALS
WEIGHT: 191.6 LBS | TEMPERATURE: 97.7 F | DIASTOLIC BLOOD PRESSURE: 88 MMHG | HEIGHT: 64 IN | OXYGEN SATURATION: 98 % | RESPIRATION RATE: 16 BRPM | HEART RATE: 86 BPM | BODY MASS INDEX: 32.71 KG/M2 | SYSTOLIC BLOOD PRESSURE: 129 MMHG

## 2023-06-08 PROCEDURE — 0503F POSTPARTUM CARE VISIT: CPT | Performed by: OBSTETRICS & GYNECOLOGY

## 2023-06-08 RX ORDER — HYDROCODONE BITARTRATE AND ACETAMINOPHEN 5; 325 MG/1; MG/1
1 TABLET ORAL EVERY 4 HOURS PRN
Qty: 12 TABLET | Refills: 0 | Status: SHIPPED | OUTPATIENT
Start: 2023-06-08 | End: 2023-06-13

## 2023-06-08 RX ORDER — IBUPROFEN 600 MG/1
600 TABLET ORAL EVERY 6 HOURS PRN
Qty: 20 TABLET | Refills: 0 | Status: SHIPPED | OUTPATIENT
Start: 2023-06-08

## 2023-06-08 NOTE — PLAN OF CARE
Goal Outcome Evaluation:      PP day 2. Vss, up/ambulating, pain controlled, bleeding light. D/c pending.

## 2023-06-08 NOTE — LACTATION NOTE
LC follow up. Patient anticipates D/C today. Milk is coming in and baby is latching well per mom. Patient has LC contact numbers and knows to call as needed. Baby is SGA but term and continues to supplement with formula for now.

## 2023-06-08 NOTE — DISCHARGE SUMMARY
PPD 2 Discharge Summary/Progress      This  female, was admitted on 2023 and underwent a Vaginal, Spontaneous  on 2023 , resulting in the birth of the following:        Infant    Findings:    Infant observations:                            APGARS  One minute Five minutes Ten minutes Fifteen minutes Twenty minutes   Skin color: 0   1             Heart rate: 2   2             Grimace: 2   2              Muscle tone: 2   2              Breathin   2              Totals: 8   9                  LABS:   Lab Results (last 72 hours)       Procedure Component Value Units Date/Time    CBC & Differential [[id number]]  (Abnormal) Collected: 23    Specimen: Blood Updated: 23    Narrative:      The following orders were created for panel order CBC & Differential.  Procedure                               Abnormality         Status                     ---------                               -----------         ------                     CBC Auto Differential[[id number]]        Abnormal            Final result                 Please view results for these tests on the individual orders.    CBC Auto Differential [[id number]]  (Abnormal) Collected: 23    Specimen: Blood Updated: 23     WBC 16.23 10*3/mm3      RBC 4.34 10*6/mm3      Hemoglobin 11.4 g/dL      Hematocrit 34.1 %      MCV 78.6 fL      MCH 26.3 pg      MCHC 33.4 g/dL      RDW 16.5 %      RDW-SD 46.6 fl      MPV 9.4 fL      Platelets 243 10*3/mm3      Neutrophil % 76.2 %      Lymphocyte % 16.8 %      Monocyte % 5.5 %      Eosinophil % 0.5 %      Basophil % 0.3 %      Immature Grans % 0.7 %      Neutrophils, Absolute 12.37 10*3/mm3      Lymphocytes, Absolute 2.72 10*3/mm3      Monocytes, Absolute 0.90 10*3/mm3      Eosinophils, Absolute 0.08 10*3/mm3      Basophils, Absolute 0.05 10*3/mm3      Immature Grans, Absolute 0.11 10*3/mm3      nRBC 0.0 /100 WBC     Blood Gas, Arterial, Cord [[id number]]   (Abnormal) Collected: 06/06/23 1214    Specimen: Cord Blood Arterial from Umbilical Cord Updated: 06/06/23 1216     Site N/A     Comment: N/A        pH, Cord Arterial 7.27 pH Units      pCO2, Cord Arterial 55.1 mmHg      pO2, Cord Arterial <21.2 mmHg      HCO3, Cord Arterial 25.3 mmol/L      Base Exc, Cord Arterial -2.8 mmol/L      Barometric Pressure for Blood Gas 747.5 mmHg      Modality Room Air     O2 Saturation Calculated 4.0 %      Comment: [id number] Meter: [id number] : [id number] Chela Ordonez        Note --    CBC & Differential [[id number]]  (Abnormal) Collected: 06/05/23 1959    Specimen: Blood Updated: 06/05/23 2049    Narrative:      The following orders were created for panel order CBC & Differential.  Procedure                               Abnormality         Status                     ---------                               -----------         ------                     CBC Auto Differential[[id number]]        Abnormal            Final result                 Please view results for these tests on the individual orders.    CBC Auto Differential [[id number]]  (Abnormal) Collected: 06/05/23 1959    Specimen: Blood Updated: 06/05/23 2049     WBC 9.95 10*3/mm3      RBC 4.61 10*6/mm3      Hemoglobin 11.9 g/dL      Hematocrit 35.9 %      MCV 77.9 fL      MCH 25.8 pg      MCHC 33.1 g/dL      RDW 16.1 %      RDW-SD 44.9 fl      MPV 10.2 fL      Platelets 262 10*3/mm3      Neutrophil % 65.6 %      Lymphocyte % 26.6 %      Monocyte % 5.8 %      Eosinophil % 0.7 %      Basophil % 0.5 %      Immature Grans % 0.8 %      Neutrophils, Absolute 6.52 10*3/mm3      Lymphocytes, Absolute 2.65 10*3/mm3      Monocytes, Absolute 0.58 10*3/mm3      Eosinophils, Absolute 0.07 10*3/mm3      Basophils, Absolute 0.05 10*3/mm3      Immature Grans, Absolute 0.08 10*3/mm3      nRBC 0.1 /100 WBC               ROS:  Pulm: neg for soa  GI: neg for heavy bleeding  Musculoskel: neg for leg pain       Discharge Medications         New Medications        Instructions Start Date   HYDROcodone-acetaminophen 5-325 MG per tablet  Commonly known as: NORCO   1 tablet, Oral, Every 4 Hours PRN      ibuprofen 600 MG tablet  Commonly known as: ADVIL,MOTRIN   600 mg, Oral, Every 6 Hours PRN             Continue These Medications        Instructions Start Date   PNV Prenatal Plus Multivit+DHA 27-1 & 312 MG misc   1 tablet, Oral, Daily      valACYclovir 1000 MG tablet  Commonly known as: Valtrex   1,000 mg, Oral, 2 Times Daily             Stop These Medications      aspirin 81 MG EC tablet                 OB History    Para Term  AB Living   3 2 2   1 2   SAB IAB Ectopic Molar Multiple Live Births   1       0 2      # Outcome Date GA Lbr Cody/2nd Weight Sex Delivery Anes PTL Lv   3 Term 23 40w0d 06:36 / 00:02 2155 g (4 lb 12 oz) F Vag-Spont None N LUNA      Birth Comments: scale 2   2 Term 10/10/21 37w6d 05:38 / 00:19 2676 g (5 lb 14.4 oz) F Vag-Spont Local N LUNA      Birth Comments: scale 4   1 SAB 2016     SAB           ASSESSMENT/DISCHARGE SUMMARY      Principal Problem:    Oligohydramnios  Active Problems:    Blood type, Rh negative      Overview: spouse Rh neg, pt declined rhogam    History of cervical dysplasia      Overview: HSIL, LEEP 2017, negative margins    History of loop electrosurgical excision procedure (LEEP) of cervix affecting pregnancy, antepartum    Herpes simplex vulvovaginitis    AMA (advanced maternal age) multigravida 35+    Sickle cell trait    Pregnancy    Maternal care for poor fetal growth in third trimester    IUGR (intrauterine growth restriction) affecting care of mother, third trimester, fetus 1       Post delivery the patient did well. She was tolerating a regular diet, ambulating, voiding and passing flatus. Post delivery hemoglobin was   Lab Results   Component Value Date    HGB 11.4 (L) 2023   .      Discharge diagnosis:   Medical Problems       Hospital Problem List       * (Principal)  Oligohydramnios    Blood type, Rh negative    Overview Signed 10/1/2021 10:09 AM by Cassandra Fields MD     spouse Rh neg, pt declined rhogam         History of cervical dysplasia    Overview Signed 10/31/2017  8:30 AM by Cassandra Fields MD     HSIL, LEEP 2017, negative margins         History of loop electrosurgical excision procedure (LEEP) of cervix affecting pregnancy, antepartum    Herpes simplex vulvovaginitis    AMA (advanced maternal age) multigravida 35+    Sickle cell trait    Pregnancy    Maternal care for poor fetal growth in third trimester    IUGR (intrauterine growth restriction) affecting care of mother, third trimester, fetus 1                                           Oligohydramnios [O41.00X0]                                     Vaginal Delivery at 40w0d, uncomplicated recovery    On day of discharge, uterus was firm, extremities were non tender with no erythema or masses. Lochia was normal.    Pt was given prescriptions for Percocet 5/325 and Motrin PRN for pain.    Instructed to call the office to make an appointment in  6 weeks after your delivery.    Please call the office, or the OB/GYN on-call if after-hours, for any questions, concerns or any of the followin) Fever - a temperature greater than 100.4  2) Uncontrolled pain  3) Uncontrolled bleeding (soaking more than 1 pad in an hour)  4) Foul-smelling discharge from the vagina    Do not place anything in the vagina - this includes tampons, douches or having sex - until after your 6 week postpartum visit .    Jeovanny Claudio MD    2023  09:05 EDT

## 2023-06-08 NOTE — LACTATION NOTE
This note was copied from a baby's chart.  P2, T  BF other child 15 months with a good supply. This baby BW 4 lb 12 oz, SGA bgms complete. Gel times 3. HGP mom has used twice today bc baby is bf very well now. Formula is being supplemented until her milk is in. Baby was on right breast in side lying and mom stated she has a good latch. Gave lanolin for mild soreness. Has PBP. Asked mom to review Postpartum handbook and [facility] info and encouraged her to call LC if she needed anything.

## 2023-06-12 ENCOUNTER — TELEPHONE (OUTPATIENT)
Dept: FAMILY MEDICINE CLINIC | Facility: CLINIC | Age: 38
End: 2023-06-12
Payer: COMMERCIAL

## 2023-06-12 NOTE — TELEPHONE ENCOUNTER
Caller: Gustabo Jacome    Relationship: Self    Best call back number:     [phone]         What was the call regarding:     MOTHER IS HOPING YOU WILL TAKE ON HER NEW BORN INFANT AS A NEW PATIENT.      HER  OTHER  CHILD NELLA WONG IS ALREADY A PATIENT OF YOURS AND SHE IS HOPING TO KEEP THEM BOTH UNDER THE SAME DOCTOR'S CARE.        PLEASE CALL AND ADVISE.

## 2023-08-25 ENCOUNTER — TELEPHONE (OUTPATIENT)
Dept: OBSTETRICS AND GYNECOLOGY | Age: 38
End: 2023-08-25
Payer: COMMERCIAL

## 2023-08-25 NOTE — TELEPHONE ENCOUNTER
Pt calling stating she is needing a note stating that she can be released back to work. Pt needs letter to state if she has any restrictions or if she can be released back to full duty. Pt is post partum & had vaginal delivery on 6/6/23. FYI pt aware you are out of office until Monday.

## 2023-08-25 NOTE — TELEPHONE ENCOUNTER
What date should we state she can be released , and I would say no restrictions if that is ok with her. ??

## 2023-08-28 NOTE — TELEPHONE ENCOUNTER
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     PATIENT CAN BE REACHED [phone] OR IN [place]      PATIENT IS ALSO ASKING THAT THE NOTE BE PUT IN MYCHART PLEASE    THANK YOU

## 2023-10-30 RX ORDER — VITAMIN A, ASCORBIC ACID, CHOLECALCIFEROL, TOCOPHEROL, THIAMINE MONONITRATE, RIBOFLAVIN, PYRIDOXINE, FOLIC ACID, CYANOCOBALAMIN, CALCIUM CARBONATE, FERROUS FUMARATE, ZINC OXIDE, CUPRIC OXIDE, NIACINAMIDE, AND FISH OIL 27-1-250MG
KIT ORAL
Qty: 60 EACH | Refills: 0 | Status: SHIPPED | OUTPATIENT
Start: 2023-10-30

## 2024-04-04 RX ORDER — VITAMIN A, ASCORBIC ACID, CHOLECALCIFEROL, TOCOPHEROL, THIAMINE MONONITRATE, RIBOFLAVIN, PYRIDOXINE, FOLIC ACID, CYANOCOBALAMIN, CALCIUM CARBONATE, FERROUS FUMARATE, ZINC OXIDE, CUPRIC OXIDE, NIACINAMIDE, AND FISH OIL 27-1-250MG
KIT ORAL
Qty: 60 EACH | Refills: 0 | Status: SHIPPED | OUTPATIENT
Start: 2024-04-04

## 2024-08-28 NOTE — TELEPHONE ENCOUNTER
Conjuntivae pink,  Mildly icteric S/w pt, she requested codes so she could check insurance benefits.  Procedure: 099738 and 94590, Diagnosis N63.0.    Pt will call [facility] to schedule, wants to get done before the end of the year.      She also asked about scheduling with Dr. Haines, and I informed her that they need imaging done first, in case she needs further testing (bx?) prior to seeing Dr. Haines.  She understands.

## (undated) DEVICE — ST TBG ENDOMAT HYSTSCPY

## (undated) DEVICE — DEV TISS REMOV MYOSURE REACH

## (undated) DEVICE — PAD GRND REM POLYHESIVE A/ DISP

## (undated) DEVICE — SKIN PREP TRAY W/CHG: Brand: MEDLINE INDUSTRIES, INC.

## (undated) DEVICE — GLV SURG BIOGEL LTX PF 6 1/2

## (undated) DEVICE — MEDI-VAC YANKAUER SUCTION HANDLE W/BULBOUS TIP: Brand: CARDINAL HEALTH

## (undated) DEVICE — SEAL HYSTERSCOPE/OUTFLOW CHANNEL MYOSURE

## (undated) DEVICE — GLV SURG SIGNATURE ESSENTIAL PF LTX SZ6.5

## (undated) DEVICE — OSC HYSTEROSCOPY: Brand: MEDLINE INDUSTRIES, INC.

## (undated) DEVICE — 1016 S-DRAPE IRRIG POUCH 10/BOX: Brand: STERI-DRAPE™